# Patient Record
Sex: FEMALE | Race: WHITE | NOT HISPANIC OR LATINO | Employment: FULL TIME | URBAN - METROPOLITAN AREA
[De-identification: names, ages, dates, MRNs, and addresses within clinical notes are randomized per-mention and may not be internally consistent; named-entity substitution may affect disease eponyms.]

---

## 2019-12-24 ENCOUNTER — APPOINTMENT (OUTPATIENT)
Dept: URGENT CARE | Facility: CLINIC | Age: 49
End: 2019-12-24

## 2019-12-24 DIAGNOSIS — Z02.1 PRE-EMPLOYMENT HEALTH SCREENING EXAMINATION: Primary | ICD-10-CM

## 2019-12-24 LAB
HBV SURFACE AB SER-ACNC: 5.95 MIU/ML
RUBV IGG SERPL IA-ACNC: >175 IU/ML

## 2019-12-24 PROCEDURE — 86706 HEP B SURFACE ANTIBODY: CPT | Performed by: NURSE PRACTITIONER

## 2019-12-24 PROCEDURE — 86735 MUMPS ANTIBODY: CPT | Performed by: NURSE PRACTITIONER

## 2019-12-24 PROCEDURE — 86765 RUBEOLA ANTIBODY: CPT | Performed by: NURSE PRACTITIONER

## 2019-12-24 PROCEDURE — 86480 TB TEST CELL IMMUN MEASURE: CPT | Performed by: NURSE PRACTITIONER

## 2019-12-24 PROCEDURE — 86762 RUBELLA ANTIBODY: CPT | Performed by: NURSE PRACTITIONER

## 2019-12-24 PROCEDURE — 86787 VARICELLA-ZOSTER ANTIBODY: CPT | Performed by: NURSE PRACTITIONER

## 2019-12-27 LAB
GAMMA INTERFERON BACKGROUND BLD IA-ACNC: 0.06 IU/ML
M TB IFN-G BLD-IMP: NEGATIVE
M TB IFN-G CD4+ BCKGRND COR BLD-ACNC: 0 IU/ML
M TB IFN-G CD4+ BCKGRND COR BLD-ACNC: 0 IU/ML
MEV IGG SER QL: NORMAL
MITOGEN IGNF BCKGRD COR BLD-ACNC: >10 IU/ML
MUV IGG SER QL: NORMAL
VZV IGG SER IA-ACNC: NORMAL

## 2020-07-24 ENCOUNTER — NURSE TRIAGE (OUTPATIENT)
Dept: OTHER | Facility: OTHER | Age: 50
End: 2020-07-24

## 2020-07-24 DIAGNOSIS — Z11.59 SPECIAL SCREENING EXAMINATION FOR VIRAL DISEASE: Primary | ICD-10-CM

## 2020-07-24 DIAGNOSIS — Z11.59 SPECIAL SCREENING EXAMINATION FOR VIRAL DISEASE: ICD-10-CM

## 2020-07-24 PROCEDURE — U0003 INFECTIOUS AGENT DETECTION BY NUCLEIC ACID (DNA OR RNA); SEVERE ACUTE RESPIRATORY SYNDROME CORONAVIRUS 2 (SARS-COV-2) (CORONAVIRUS DISEASE [COVID-19]), AMPLIFIED PROBE TECHNIQUE, MAKING USE OF HIGH THROUGHPUT TECHNOLOGIES AS DESCRIBED BY CMS-2020-01-R: HCPCS

## 2020-07-24 NOTE — TELEPHONE ENCOUNTER
Regarding: covid  ----- Message from [facility] sent at 7/24/2020  7:45 AM EDT -----  Patient would like to be tested for covid due to possible contact with positive patient

## 2020-07-24 NOTE — TELEPHONE ENCOUNTER
Reason for Disposition   [1] Caller concerned that exposure to COVID-19 occurred BUT [2] does not meet COVID-19 EXPOSURE criteria from [facility]    Answer Assessment - Initial Assessment Questions  1  CLOSE CONTACT: "Who is the person with the confirmed or suspected COVID-19 infection that you were exposed to?"      Denies known positive contact but  has a fever and is vomiting  States she works for [employer]  2  PLACE of CONTACT: "Where were you when you were exposed to COVID-19?" (e g , home, school, medical waiting room; which city?)      Denies known contact  3  TYPE of CONTACT: "How much contact was there?" (e g , sitting next to, live in same house, work in same office, same building)      Denies known contact  4  DURATION of CONTACT: "How long were you in contact with the COVID-19 patient?" (e g , a few seconds, passed by person, a few minutes, live with the patient)      Denies  5  DATE of CONTACT: "When did you have contact with a COVID-19 patient?" (e g , how many days ago)      Denies  6  TRAVEL: "Have you traveled out of the country recently?" If so, "When and where?"      * Also ask about out-of-state travel, since the CDC has identified some high-risk cities for community spread in the [address] [place] [address] [address]  * Note: Travel becomes less relevant if there is widespread community transmission where the patient lives  Denies  7  COMMUNITY SPREAD: "Are there lots of cases of COVID-19 (community spread) where you live?" (See public health department website, if unsure)        Uncertain  8  SYMPTOMS: "Do you have any symptoms?" (e g , fever, cough, breathing difficulty)      No symptoms at this time  9  PREGNANCY OR POSTPARTUM: "Is there any chance you are pregnant?" "When was your last menstrual period?" "Did you deliver in the last 2 weeks?"      Denies  10  HIGH RISK: "Do you have any heart or lung problems?  Do you have a weak immune system?" (e g , CHF, COPD, asthma, HIV positive, chemotherapy, renal failure, diabetes mellitus, sickle cell anemia)        Denies  Protocols used: CORONAVIRUS (KRJCN-02) EXPOSURE-ADULT-    Currently does not have a PCP  Pt is asking to get tested

## 2020-07-25 LAB — SARS-COV-2 RNA SPEC QL NAA+PROBE: NOT DETECTED

## 2020-11-09 ENCOUNTER — OFFICE VISIT (OUTPATIENT)
Dept: OBGYN CLINIC | Facility: CLINIC | Age: 50
End: 2020-11-09
Payer: COMMERCIAL

## 2020-11-09 VITALS — WEIGHT: 136 LBS | SYSTOLIC BLOOD PRESSURE: 122 MMHG | TEMPERATURE: 98.6 F | DIASTOLIC BLOOD PRESSURE: 70 MMHG

## 2020-11-09 DIAGNOSIS — Z12.31 ENCOUNTER FOR SCREENING MAMMOGRAM FOR MALIGNANT NEOPLASM OF BREAST: Primary | ICD-10-CM

## 2020-11-09 DIAGNOSIS — D25.2 SUBSEROSAL LEIOMYOMA OF UTERUS: ICD-10-CM

## 2020-11-09 DIAGNOSIS — Z01.411 ENCOUNTER FOR GYNECOLOGICAL EXAMINATION WITH ABNORMAL FINDING: ICD-10-CM

## 2020-11-09 PROCEDURE — 99386 PREV VISIT NEW AGE 40-64: CPT | Performed by: OBSTETRICS & GYNECOLOGY

## 2020-11-11 ENCOUNTER — ULTRASOUND (OUTPATIENT)
Dept: OBGYN CLINIC | Facility: CLINIC | Age: 50
End: 2020-11-11
Payer: COMMERCIAL

## 2020-11-11 DIAGNOSIS — D21.9 FIBROID: Primary | ICD-10-CM

## 2020-11-11 PROCEDURE — 76856 US EXAM PELVIC COMPLETE: CPT | Performed by: OBSTETRICS & GYNECOLOGY

## 2020-11-11 PROCEDURE — 76830 TRANSVAGINAL US NON-OB: CPT | Performed by: OBSTETRICS & GYNECOLOGY

## 2020-11-14 ENCOUNTER — LAB REQUISITION (OUTPATIENT)
Dept: LAB | Facility: HOSPITAL | Age: 50
End: 2020-11-14

## 2020-11-14 DIAGNOSIS — Z20.828 CONTACT WITH AND (SUSPECTED) EXPOSURE TO OTHER VIRAL COMMUNICABLE DISEASES: ICD-10-CM

## 2020-11-14 PROCEDURE — U0003 INFECTIOUS AGENT DETECTION BY NUCLEIC ACID (DNA OR RNA); SEVERE ACUTE RESPIRATORY SYNDROME CORONAVIRUS 2 (SARS-COV-2) (CORONAVIRUS DISEASE [COVID-19]), AMPLIFIED PROBE TECHNIQUE, MAKING USE OF HIGH THROUGHPUT TECHNOLOGIES AS DESCRIBED BY CMS-2020-01-R: HCPCS | Performed by: PHYSICIAN ASSISTANT

## 2020-11-15 LAB — SARS-COV-2 RNA SPEC QL NAA+PROBE: NOT DETECTED

## 2020-12-22 ENCOUNTER — IMMUNIZATIONS (OUTPATIENT)
Dept: FAMILY MEDICINE CLINIC | Facility: HOSPITAL | Age: 50
End: 2020-12-22
Payer: COMMERCIAL

## 2020-12-22 DIAGNOSIS — Z23 ENCOUNTER FOR IMMUNIZATION: ICD-10-CM

## 2020-12-22 PROCEDURE — 91300 SARS-COV-2 / COVID-19 MRNA VACCINE (PFIZER-BIONTECH) 30 MCG: CPT

## 2020-12-22 PROCEDURE — 0001A SARS-COV-2 / COVID-19 MRNA VACCINE (PFIZER-BIONTECH) 30 MCG: CPT

## 2021-01-12 ENCOUNTER — IMMUNIZATIONS (OUTPATIENT)
Dept: FAMILY MEDICINE CLINIC | Facility: HOSPITAL | Age: 51
End: 2021-01-12

## 2021-01-12 DIAGNOSIS — Z23 ENCOUNTER FOR IMMUNIZATION: ICD-10-CM

## 2021-01-12 PROCEDURE — 91300 SARS-COV-2 / COVID-19 MRNA VACCINE (PFIZER-BIONTECH) 30 MCG: CPT

## 2021-01-12 PROCEDURE — 0002A SARS-COV-2 / COVID-19 MRNA VACCINE (PFIZER-BIONTECH) 30 MCG: CPT

## 2021-01-22 ENCOUNTER — HOSPITAL ENCOUNTER (OUTPATIENT)
Dept: MAMMOGRAPHY | Facility: IMAGING CENTER | Age: 51
Discharge: HOME | End: 2021-01-22
Payer: COMMERCIAL

## 2021-01-22 VITALS — WEIGHT: 135 LBS | BODY MASS INDEX: 21.19 KG/M2 | HEIGHT: 67 IN

## 2021-01-22 DIAGNOSIS — Z12.31 ENCOUNTER FOR SCREENING MAMMOGRAM FOR MALIGNANT NEOPLASM OF BREAST: ICD-10-CM

## 2021-01-22 DIAGNOSIS — Z12.31 VISIT FOR SCREENING MAMMOGRAM: ICD-10-CM

## 2021-01-22 PROCEDURE — 77067 SCR MAMMO BI INCL CAD: CPT

## 2021-01-22 PROCEDURE — 77063 BREAST TOMOSYNTHESIS BI: CPT

## 2021-02-15 ENCOUNTER — OFFICE VISIT (OUTPATIENT)
Dept: OBGYN CLINIC | Facility: CLINIC | Age: 51
End: 2021-02-15
Payer: COMMERCIAL

## 2021-02-15 VITALS
BODY MASS INDEX: 21.5 KG/M2 | DIASTOLIC BLOOD PRESSURE: 70 MMHG | SYSTOLIC BLOOD PRESSURE: 112 MMHG | HEIGHT: 67 IN | WEIGHT: 137 LBS

## 2021-02-15 DIAGNOSIS — N92.0 MENORRHAGIA WITH REGULAR CYCLE: Primary | ICD-10-CM

## 2021-02-15 PROCEDURE — 99203 OFFICE O/P NEW LOW 30 MIN: CPT | Performed by: OBSTETRICS & GYNECOLOGY

## 2021-02-15 RX ORDER — TRANEXAMIC ACID 650 1/1
1300 TABLET ORAL 3 TIMES DAILY
Qty: 30 TABLET | Refills: 2 | Status: SHIPPED | OUTPATIENT
Start: 2021-02-15 | End: 2021-02-20

## 2021-02-16 ENCOUNTER — HOSPITAL ENCOUNTER (OUTPATIENT)
Dept: ULTRASOUND IMAGING | Facility: CLINIC | Age: 51
Discharge: HOME | End: 2021-02-16
Payer: COMMERCIAL

## 2021-02-16 ENCOUNTER — HOSPITAL ENCOUNTER (OUTPATIENT)
Dept: MAMMOGRAPHY | Facility: CLINIC | Age: 51
Discharge: HOME | End: 2021-02-16
Payer: COMMERCIAL

## 2021-02-16 VITALS — HEIGHT: 66 IN | WEIGHT: 137 LBS | BODY MASS INDEX: 22.02 KG/M2

## 2021-02-16 DIAGNOSIS — R92.8 ABNORMAL MAMMOGRAM: ICD-10-CM

## 2021-02-16 PROCEDURE — 77065 DX MAMMO INCL CAD UNI: CPT

## 2021-02-16 PROCEDURE — 76642 ULTRASOUND BREAST LIMITED: CPT

## 2021-02-16 PROCEDURE — G0279 TOMOSYNTHESIS, MAMMO: HCPCS

## 2021-02-16 NOTE — PROGRESS NOTES
Gynecology   Deanna Barclay 53 y o  female MRN: [id number]    Assessment/Plan     Menorrhagia with regular cycle due to fibroid uterus    - CBC and Platelet; Future  - Tranexamic Acid (Lysteda) 650 MG TABS; Take 2 tablets (1,300 mg total) by mouth 3 (three) times a day for 5 days  Dispense: 30 tablet; Refill: 2  - if CBC wnl and Lysteda controls flow then expectant management is appropriate  - if anemic OR Lysteda fails to control bleeding patient should consider surgery  - reviewed how Mirena IUD and Novasure are not indicated size of uterus and s/p [place]    History of Present Illness     HPI:  Deanna Barclay is a 53 y o  female who presents with known fibroid uterus s/p uterine embolization approximately 10 years ago  She states the first 2 days of her menses are debilitating; has to double up on products and usually misses activities  However, menses are better since the [place]  She notes a slight increase in amount of flow over the past year  Her GYN told her she needs a hysterectomy  No medical management has been attempted  Patient feels that her symptoms are not sufficient enough for surgery  Hoping menopause will come soon        Historical Information   Past Medical History:   Diagnosis Date    Fibroid     Herpes      Past Surgical History:   Procedure Laterality Date    BREAST CYST ASPIRATION Left     neg results    IR UTERINE ARTERY EMBOLIZATION       OB/GYN History:   OB History        1    Para   1    Term   1            AB        Living   1       SAB        TAB        Ectopic        Multiple        Live Births   1                 Family History   Problem Relation Age of Onset   Jaiden Carias Hypertension Mother     Breast cancer Mother     Parkinsonism Father     No Known Problems Sister     No Known Problems Maternal Grandmother     No Known Problems Maternal Grandfather     No Known Problems Paternal Grandmother     No Known Problems Paternal Grandfather     No Known Problems Sister     No Known Problems Half-Sister     No Known Problems Maternal Aunt     No Known Problems Maternal Aunt     No Known Problems Maternal Aunt     No Known Problems Maternal Aunt     No Known Problems Maternal Aunt     No Known Problems Maternal Aunt     No Known Problems Maternal Aunt     No Known Problems Paternal Aunt     Heart disease Son      Social History   Social History     Substance and Sexual Activity   Alcohol Use Yes    Comment: social     Social History     Substance and Sexual Activity   Drug Use Never     Social History     Tobacco Use   Smoking Status Never Smoker   Smokeless Tobacco Never Used     E-Cigarette/Vaping    E-Cigarette Use Never User      E-Cigarette/Vaping Substances    Nicotine No     THC No     CBD No     Flavoring No     Other No     Unknown No        Meds/Allergies   No current outpatient medications on file prior to visit  No current facility-administered medications on file prior to visit  No Known Allergies     Review of Systems   Constitution: Negative for chills, decreased appetite, diaphoresis and fever  Respiratory: Negative for cough, shortness of breath, sputum production and wheezing  Gastrointestinal: Negative for bloating, abdominal pain, change in bowel habit and constipation  Genitourinary: Positive for menorrhagia  Negative for incomplete emptying, non-menstrual bleeding and pelvic pain  Objective   Vitals: Blood pressure 112/70, height 5' 6 5" (1 689 m), weight 62 1 kg (137 lb), last menstrual period 01/24/2021  Physical Exam  Constitutional:       Appearance: Normal appearance  HENT:      Head: Normocephalic  Cardiovascular:      Rate and Rhythm: Normal rate and regular rhythm  Pulmonary:      Effort: Pulmonary effort is normal    Musculoskeletal:         General: No swelling  Neurological:      General: No focal deficit present        Mental Status: She is alert and oriented to person, place, and time    Skin:     General: Skin is warm and dry  Psychiatric:         Mood and Affect: Mood normal          Behavior: Behavior normal    Vitals signs reviewed

## 2021-02-17 ENCOUNTER — TRANSCRIBE ORDERS (OUTPATIENT)
Dept: LAB | Facility: CLINIC | Age: 51
End: 2021-02-17

## 2021-02-17 ENCOUNTER — LAB (OUTPATIENT)
Dept: LAB | Facility: CLINIC | Age: 51
End: 2021-02-17
Payer: COMMERCIAL

## 2021-02-17 DIAGNOSIS — N92.0 MENORRHAGIA WITH REGULAR CYCLE: ICD-10-CM

## 2021-02-17 LAB
ERYTHROCYTE [DISTWIDTH] IN BLOOD BY AUTOMATED COUNT: 13.7 % (ref 11.6–15.1)
HCT VFR BLD AUTO: 40.4 % (ref 34.8–46.1)
HGB BLD-MCNC: 13.1 G/DL (ref 11.5–15.4)
MCH RBC QN AUTO: 29.2 PG (ref 26.8–34.3)
MCHC RBC AUTO-ENTMCNC: 32.4 G/DL (ref 31.4–37.4)
MCV RBC AUTO: 90 FL (ref 82–98)
PLATELET # BLD AUTO: 260 THOUSANDS/UL (ref 149–390)
PMV BLD AUTO: 10.7 FL (ref 8.9–12.7)
RBC # BLD AUTO: 4.49 MILLION/UL (ref 3.81–5.12)
WBC # BLD AUTO: 6.87 THOUSAND/UL (ref 4.31–10.16)

## 2021-02-17 PROCEDURE — 85027 COMPLETE CBC AUTOMATED: CPT

## 2021-02-17 PROCEDURE — 36415 COLL VENOUS BLD VENIPUNCTURE: CPT

## 2021-06-07 ENCOUNTER — TELEPHONE (OUTPATIENT)
Dept: OBGYN CLINIC | Facility: CLINIC | Age: 51
End: 2021-06-07

## 2021-06-07 DIAGNOSIS — D21.9 FIBROIDS: Primary | ICD-10-CM

## 2021-06-07 NOTE — TELEPHONE ENCOUNTER
Pt is saying she was referred to get her thyroid checked with us wondering about that with dr obando

## 2021-06-07 NOTE — TELEPHONE ENCOUNTER
Pt is asking about getting her Fibroids checked via US, she said she feels like she has to urinate more frequently and thinks they are getting larger

## 2021-06-09 ENCOUNTER — HOSPITAL ENCOUNTER (OUTPATIENT)
Dept: ULTRASOUND IMAGING | Facility: HOSPITAL | Age: 51
Discharge: HOME | End: 2021-06-09
Payer: COMMERCIAL

## 2021-06-09 DIAGNOSIS — D21.9 FIBROIDS: ICD-10-CM

## 2021-06-09 PROCEDURE — 76830 TRANSVAGINAL US NON-OB: CPT

## 2021-06-09 PROCEDURE — 76856 US EXAM PELVIC COMPLETE: CPT

## 2021-06-17 ENCOUNTER — TELEPHONE (OUTPATIENT)
Dept: OBGYN CLINIC | Facility: CLINIC | Age: 51
End: 2021-06-17

## 2021-06-17 NOTE — TELEPHONE ENCOUNTER
----- Message from Alcides Wayne MD sent at 6/17/2021 10:55 AM EDT -----  Please notify patient that her uterus is overall smaller than her scan in 2014  No concerning findings  If her bleeding becomes unmanageable she needs to call me!

## 2022-02-22 ENCOUNTER — OFFICE VISIT (OUTPATIENT)
Dept: INTERNAL MEDICINE CLINIC | Facility: CLINIC | Age: 52
End: 2022-02-22
Payer: COMMERCIAL

## 2022-02-22 VITALS
WEIGHT: 134 LBS | HEIGHT: 67 IN | OXYGEN SATURATION: 98 % | DIASTOLIC BLOOD PRESSURE: 70 MMHG | SYSTOLIC BLOOD PRESSURE: 120 MMHG | BODY MASS INDEX: 21.03 KG/M2 | RESPIRATION RATE: 12 BRPM | TEMPERATURE: 97.8 F | HEART RATE: 66 BPM

## 2022-02-22 DIAGNOSIS — Z12.11 SCREENING FOR COLORECTAL CANCER: ICD-10-CM

## 2022-02-22 DIAGNOSIS — Z12.12 SCREENING FOR COLORECTAL CANCER: ICD-10-CM

## 2022-02-22 DIAGNOSIS — Z12.4 SCREENING FOR CERVICAL CANCER: ICD-10-CM

## 2022-02-22 DIAGNOSIS — Z12.31 ENCOUNTER FOR SCREENING MAMMOGRAM FOR BREAST CANCER: ICD-10-CM

## 2022-02-22 DIAGNOSIS — Z00.00 ANNUAL PHYSICAL EXAM: Primary | ICD-10-CM

## 2022-02-22 DIAGNOSIS — Z13.6 SCREENING FOR CARDIOVASCULAR CONDITION: ICD-10-CM

## 2022-02-22 DIAGNOSIS — D36.9 DERMOID: ICD-10-CM

## 2022-02-22 DIAGNOSIS — N95.1 PERIMENOPAUSAL: ICD-10-CM

## 2022-02-22 DIAGNOSIS — Z78.9 DIETING: ICD-10-CM

## 2022-02-22 PROBLEM — N92.4 EXCESSIVE BLEEDING IN PREMENOPAUSAL PERIOD: Status: ACTIVE | Noted: 2022-02-22

## 2022-02-22 PROBLEM — M75.102 ROTATOR CUFF TEAR, LEFT: Status: ACTIVE | Noted: 2022-02-22

## 2022-02-22 PROBLEM — M51.26 HNP (HERNIATED NUCLEUS PULPOSUS), LUMBAR: Status: ACTIVE | Noted: 2022-02-22

## 2022-02-22 PROBLEM — M21.851 HIP DYSPLASIA, ACQUIRED, RIGHT: Status: ACTIVE | Noted: 2022-02-22

## 2022-02-22 PROBLEM — D21.9 FIBROIDS: Status: ACTIVE | Noted: 2022-02-22

## 2022-02-22 PROCEDURE — 99386 PREV VISIT NEW AGE 40-64: CPT | Performed by: INTERNAL MEDICINE

## 2022-02-22 RX ORDER — TRANEXAMIC ACID 650 1/1
TABLET ORAL
COMMUNITY
Start: 2021-11-23

## 2022-02-22 NOTE — PATIENT INSTRUCTIONS

## 2022-02-22 NOTE — PROGRESS NOTES
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  Sees gyn  Mammogram  Keto diet  Since 2021        [address] [facility] INTERNAL MEDICINE    NAME: Oneal Green  AGE: 49 y o  SEX: female  : 1970     DATE: 2022     Assessment and Plan:     Problem List Items Addressed This Visit        Other    Screening for cardiovascular condition - Primary    Relevant Orders    Lipid panel    Screening for cervical cancer    Relevant Orders    Ambulatory referral to Obstetrics / Gynecology    Perimenopausal    Relevant Orders    FSH and LH    Dieting    Relevant Orders    Vitamin B12      Other Visit Diagnoses     Dermoid        Relevant Orders    Ambulatory Referral to Dermatology          Immunizations and preventive care screenings were discussed with patient today  Appropriate education was printed on patient's after visit summary  Counseling:  · Exercise: the importance of regular exercise/physical activity was discussed  Recommend exercise 3-5 times per week for at least 30 minutes  Depression Screening and Follow-up Plan: Patient was screened for depression during today's encounter  They screened negative with a PHQ-2 score of 0  Return in 6 months (on 2022), or if symptoms worsen or fail to improve  Chief Complaint:     Chief Complaint   Patient presents with    Annual Exam      History of Present Illness:     Adult Annual Physical   Patient here for a comprehensive physical exam  The patient reports no problems  Diet and Physical Activity  · Diet/Nutrition: well balanced diet  · Exercise: walking  Depression Screening  PHQ-2/9 Depression Screening    Little interest or pleasure in doing things: 0 - not at all  Feeling down, depressed, or hopeless: 0 - not at all  PHQ-2 Score: 0  PHQ-2 Interpretation: Negative depression screen       General Health  · Sleep: sleeps well     · Hearing: normal - bilateral   · Vision: no vision problems  · Dental: regular dental visits  /GYN Health  · Patient is: premenopausal  · Last menstrual period:    · Contraceptive method:   Yunier Rahman Review of Systems:     Review of Systems   Constitutional: Negative for activity change, appetite change, chills, diaphoresis, fatigue and fever  HENT: Negative for congestion, facial swelling, hearing loss, mouth sores, rhinorrhea, sore throat, trouble swallowing and voice change  Eyes: Negative for photophobia and pain  Respiratory: Negative for apnea, cough, chest tightness, shortness of breath and stridor  Cardiovascular: Negative for chest pain, palpitations and leg swelling  Gastrointestinal: Negative for abdominal distention, abdominal pain, blood in stool and constipation  Endocrine: Negative for cold intolerance and heat intolerance  Genitourinary: Negative for difficulty urinating, dysuria, flank pain, genital sores, hematuria and urgency  Musculoskeletal: Negative for arthralgias, back pain, gait problem, joint swelling and myalgias  Skin: Negative for rash and wound  Allergic/Immunologic: Negative for environmental allergies, food allergies and immunocompromised state  Neurological: Negative for dizziness, tremors, seizures, syncope, facial asymmetry, speech difficulty, weakness, light-headedness, numbness and headaches  Hematological: Negative for adenopathy  Does not bruise/bleed easily  Psychiatric/Behavioral: Negative for agitation, behavioral problems, hallucinations, self-injury, sleep disturbance and suicidal ideas        Past Medical History:     Past Medical History:   Diagnosis Date    Fibroid     Herpes       Past Surgical History:     Past Surgical History:   Procedure Laterality Date    BREAST CYST ASPIRATION Left 2009    neg results    IR UTERINE ARTERY EMBOLIZATION        Social History:     Social History     Socioeconomic History    Marital status: /Civil Union Spouse name: None    Number of children: None    Years of education: None    Highest education level: None   Occupational History    Occupation: Pediatric echo [employer]   Tobacco Use    Smoking status: Never Smoker    Smokeless tobacco: Never Used   Vaping Use    Vaping Use: Never used   Substance and Sexual Activity    Alcohol use: Yes     Comment: social    Drug use: Never    Sexual activity: Yes     Partners: Male     Birth control/protection: Male Sterilization   Other Topics Concern    None   Social History Narrative    None     Social Determinants of Health     Financial Resource Strain: Not on file   Food Insecurity: Not on file   Transportation Needs: Not on file   Physical Activity: Not on file   Stress: Not on file   Social Connections: Not on file   Intimate Partner Violence: Not on file   Housing Stability: Not on file      Family History:     Family History   Problem Relation Age of Onset    Hypertension Mother     Breast cancer Mother     Parkinsonism Father     No Known Problems Sister     No Known Problems Maternal Grandmother     No Known Problems Maternal Grandfather     No Known Problems Paternal Grandmother     No Known Problems Paternal Grandfather     No Known Problems Sister     No Known Problems Half-Sister     No Known Problems Maternal Aunt     No Known Problems Maternal Aunt     No Known Problems Maternal Aunt     No Known Problems Maternal Aunt     No Known Problems Maternal Aunt     No Known Problems Maternal Aunt     No Known Problems Maternal Aunt     No Known Problems Paternal Aunt     Heart disease Son       Current Medications:     Current Outpatient Medications   Medication Sig Dispense Refill    Tranexamic Acid 650 MG TABS take 2 tablets by mouth three times a day for 5 days       No current facility-administered medications for this visit        Allergies:     No Known Allergies   Physical Exam:     /70   Pulse 66   Temp 97 8 °F (36 6 °C) Resp 12    5' 7" (1 702 m)   Wt 60 8 kg (134 lb)   SpO2 98%   BMI 20 99 kg/m²     Physical Exam  Constitutional:       General: She is not in acute distress  Appearance: Normal appearance  She is not ill-appearing or toxic-appearing  HENT:      Head: Normocephalic and atraumatic  Right Ear: Tympanic membrane and external ear normal       Left Ear: Tympanic membrane and external ear normal       Nose: Nose normal       Mouth/Throat:      Mouth: Mucous membranes are moist       Pharynx: Oropharynx is clear  Eyes:      General: No scleral icterus  Right eye: No discharge  Left eye: No discharge  Extraocular Movements: Extraocular movements intact  Conjunctiva/sclera: Conjunctivae normal       Pupils: Pupils are equal, round, and reactive to light  Neck:      Vascular: No carotid bruit  Cardiovascular:      Rate and Rhythm: Normal rate and regular rhythm  Pulses: Normal pulses  Heart sounds: No murmur heard  No friction rub  No gallop  Pulmonary:      Effort: Pulmonary effort is normal       Breath sounds: Normal breath sounds  No wheezing, rhonchi or rales  Abdominal:      General: Bowel sounds are normal  There is no distension  Palpations: Abdomen is soft  There is no mass  Tenderness: There is no guarding or rebound  Musculoskeletal:         General: No swelling  Cervical back: Normal range of motion and neck supple  No rigidity  Right lower leg: No edema  Left lower leg: No edema  Lymphadenopathy:      Cervical: No cervical adenopathy  Skin:     General: Skin is warm  Capillary Refill: Capillary refill takes less than 2 seconds  Coloration: Skin is not jaundiced  Findings: No rash  Neurological:      General: No focal deficit present  Mental Status: She is alert and oriented to person, place, and time  Cranial Nerves: No cranial nerve deficit  Sensory: No sensory deficit        Motor: No weakness        Gait: Gait normal       Deep Tendon Reflexes: Reflexes normal    Psychiatric:         Mood and Affect: Mood normal          Behavior: Behavior normal          Judgment: Judgment normal           Satya Staff, DO  [facility] INTERNAL MEDICINE

## 2022-02-24 ENCOUNTER — APPOINTMENT (OUTPATIENT)
Dept: LAB | Facility: CLINIC | Age: 52
End: 2022-02-24
Payer: COMMERCIAL

## 2022-02-24 DIAGNOSIS — Z00.00 ANNUAL PHYSICAL EXAM: ICD-10-CM

## 2022-02-24 DIAGNOSIS — Z78.9 DIETING: ICD-10-CM

## 2022-02-24 DIAGNOSIS — N95.1 PERIMENOPAUSAL: ICD-10-CM

## 2022-02-24 DIAGNOSIS — Z13.6 SCREENING FOR CARDIOVASCULAR CONDITION: ICD-10-CM

## 2022-02-24 LAB
ALBUMIN SERPL BCP-MCNC: 3.9 G/DL (ref 3.5–5)
ALP SERPL-CCNC: 55 U/L (ref 46–116)
ALT SERPL W P-5'-P-CCNC: 21 U/L (ref 12–78)
ANION GAP SERPL CALCULATED.3IONS-SCNC: 6 MMOL/L (ref 4–13)
AST SERPL W P-5'-P-CCNC: 12 U/L (ref 5–45)
BILIRUB SERPL-MCNC: 0.29 MG/DL (ref 0.2–1)
BUN SERPL-MCNC: 23 MG/DL (ref 5–25)
CALCIUM SERPL-MCNC: 9.4 MG/DL (ref 8.3–10.1)
CHLORIDE SERPL-SCNC: 104 MMOL/L (ref 100–108)
CHOLEST SERPL-MCNC: 187 MG/DL
CO2 SERPL-SCNC: 26 MMOL/L (ref 21–32)
CREAT SERPL-MCNC: 0.59 MG/DL (ref 0.6–1.3)
ERYTHROCYTE [DISTWIDTH] IN BLOOD BY AUTOMATED COUNT: 16.1 % (ref 11.6–15.1)
FSH SERPL-ACNC: 11 MIU/ML
GFR SERPL CREATININE-BSD FRML MDRD: 107 ML/MIN/1.73SQ M
GLUCOSE P FAST SERPL-MCNC: 85 MG/DL (ref 65–99)
HCT VFR BLD AUTO: 36.7 % (ref 34.8–46.1)
HDLC SERPL-MCNC: 61 MG/DL
HGB BLD-MCNC: 11.5 G/DL (ref 11.5–15.4)
LDLC SERPL CALC-MCNC: 113 MG/DL (ref 0–100)
LH SERPL-ACNC: 5.1 MIU/ML
MCH RBC QN AUTO: 28.9 PG (ref 26.8–34.3)
MCHC RBC AUTO-ENTMCNC: 31.3 G/DL (ref 31.4–37.4)
MCV RBC AUTO: 92 FL (ref 82–98)
NONHDLC SERPL-MCNC: 126 MG/DL
PLATELET # BLD AUTO: 254 THOUSANDS/UL (ref 149–390)
PMV BLD AUTO: 11.5 FL (ref 8.9–12.7)
POTASSIUM SERPL-SCNC: 3.8 MMOL/L (ref 3.5–5.3)
PROT SERPL-MCNC: 7.5 G/DL (ref 6.4–8.2)
RBC # BLD AUTO: 3.98 MILLION/UL (ref 3.81–5.12)
SODIUM SERPL-SCNC: 136 MMOL/L (ref 136–145)
TRIGL SERPL-MCNC: 64 MG/DL
TSH SERPL DL<=0.05 MIU/L-ACNC: 1.51 UIU/ML (ref 0.36–3.74)
VIT B12 SERPL-MCNC: 393 PG/ML (ref 100–900)
WBC # BLD AUTO: 6.17 THOUSAND/UL (ref 4.31–10.16)

## 2022-02-24 PROCEDURE — 84443 ASSAY THYROID STIM HORMONE: CPT

## 2022-02-24 PROCEDURE — 85027 COMPLETE CBC AUTOMATED: CPT

## 2022-02-24 PROCEDURE — 83001 ASSAY OF GONADOTROPIN (FSH): CPT

## 2022-02-24 PROCEDURE — 80061 LIPID PANEL: CPT

## 2022-02-24 PROCEDURE — 36415 COLL VENOUS BLD VENIPUNCTURE: CPT

## 2022-02-24 PROCEDURE — 82607 VITAMIN B-12: CPT

## 2022-02-24 PROCEDURE — 83002 ASSAY OF GONADOTROPIN (LH): CPT

## 2022-02-24 PROCEDURE — 80053 COMPREHEN METABOLIC PANEL: CPT

## 2022-03-17 LAB — COLOGUARD RESULT REPORTABLE: NEGATIVE

## 2022-03-29 ENCOUNTER — ANNUAL EXAM (OUTPATIENT)
Dept: OBGYN CLINIC | Facility: CLINIC | Age: 52
End: 2022-03-29
Payer: COMMERCIAL

## 2022-03-29 ENCOUNTER — APPOINTMENT (OUTPATIENT)
Dept: LAB | Facility: CLINIC | Age: 52
End: 2022-03-29
Payer: COMMERCIAL

## 2022-03-29 VITALS
BODY MASS INDEX: 20.34 KG/M2 | WEIGHT: 129.6 LBS | DIASTOLIC BLOOD PRESSURE: 72 MMHG | SYSTOLIC BLOOD PRESSURE: 122 MMHG | HEIGHT: 67 IN

## 2022-03-29 DIAGNOSIS — Z11.51 SCREENING FOR HUMAN PAPILLOMAVIRUS (HPV): ICD-10-CM

## 2022-03-29 DIAGNOSIS — Z01.419 ENCOUNTER FOR ANNUAL ROUTINE GYNECOLOGICAL EXAMINATION: Primary | ICD-10-CM

## 2022-03-29 DIAGNOSIS — D21.9 FIBROIDS: ICD-10-CM

## 2022-03-29 DIAGNOSIS — Z12.31 ENCOUNTER FOR SCREENING MAMMOGRAM FOR MALIGNANT NEOPLASM OF BREAST: ICD-10-CM

## 2022-03-29 DIAGNOSIS — Z00.8 HEALTH EXAMINATION IN POPULATION SURVEY: ICD-10-CM

## 2022-03-29 PROBLEM — N95.1 PERIMENOPAUSAL: Status: RESOLVED | Noted: 2022-02-22 | Resolved: 2022-03-29

## 2022-03-29 PROBLEM — D25.9 FIBROID UTERUS: Status: ACTIVE | Noted: 2022-03-29

## 2022-03-29 LAB
EST. AVERAGE GLUCOSE BLD GHB EST-MCNC: 91 MG/DL
HBA1C MFR BLD: 4.8 %

## 2022-03-29 PROCEDURE — S0612 ANNUAL GYNECOLOGICAL EXAMINA: HCPCS | Performed by: OBSTETRICS & GYNECOLOGY

## 2022-03-29 PROCEDURE — G0145 SCR C/V CYTO,THINLAYER,RESCR: HCPCS | Performed by: OBSTETRICS & GYNECOLOGY

## 2022-03-29 PROCEDURE — G0476 HPV COMBO ASSAY CA SCREEN: HCPCS | Performed by: OBSTETRICS & GYNECOLOGY

## 2022-03-29 PROCEDURE — 36415 COLL VENOUS BLD VENIPUNCTURE: CPT

## 2022-03-29 PROCEDURE — 83036 HEMOGLOBIN GLYCOSYLATED A1C: CPT

## 2022-03-29 NOTE — PROGRESS NOTES
Assessment/Plan:    1  Encounter for annual routine gynecological examination    - Liquid-based pap, screening    2  Screening for human papillomavirus (HPV)    - Liquid-based pap, screening    3  Encounter for screening mammogram for malignant neoplasm of breast    - Mammo screening bilateral w 3d & cad; Future    4  Fibroids    - continue to monitor; Ethel Santos      Gregoria Duarte is a 49 y o  female who presents for annual exam  Periods are regular every 28 days, lasting 6 days  Dysmenorrhea:none  Cyclic symptoms: none  No intermenstrual bleeding, spotting, or discharge  The patient has urinary urgency at times  No issues with IC  Current contraception: vasectomy  History of abnormal Pap smear: no  Regular self breast exam: yes  History of abnormal mammogram: yes - f/u negative 2021  History of abnormal lipids: no  Menstrual History:  OB History        1    Para   1    Term   1            AB        Living   1       SAB        IAB        Ectopic        Multiple        Live Births   1                  Patient's last menstrual period was 2022 (exact date)    Period Cycle (Days): 28  Period Duration (Days): 6  Period Pattern: Regular  Menstrual Flow: Heavy (Heavy bleeding x 2 days)  Dysmenorrhea: None    Past Medical History:   Diagnosis Date    Fibroid     Herpes        Family History   Problem Relation Age of Onset    Hypertension Mother     Breast cancer Mother     Parkinsonism Father     No Known Problems Sister     No Known Problems Maternal Grandmother     No Known Problems Maternal Grandfather     No Known Problems Paternal Grandmother     No Known Problems Paternal Grandfather     No Known Problems Sister     No Known Problems Half-Sister     No Known Problems Maternal Aunt     No Known Problems Maternal Aunt     No Known Problems Maternal Aunt     No Known Problems Maternal Aunt     No Known Problems Maternal Aunt     No Known Problems Maternal Aunt     No Known Problems Maternal Aunt     No Known Problems Paternal Aunt     Heart disease Son        The following portions of the patient's history were reviewed and updated as appropriate: allergies, current medications, past family history, past medical history, past social history, past surgical history and problem list     Review of Systems  Pertinent items are noted in HPI  Objective      /72 (BP Location: Right arm, Patient Position: Sitting, Cuff Size: Standard)   Ht 5' 6 5" (1 689 m)   Wt 58 8 kg (129 lb 9 6 oz)   LMP 03/13/2022 (Exact Date)   BMI 20 60 kg/m²     General:   alert and oriented, in no acute distress   Heart:    Breasts: regular rate and rhythm, S1, S2 normal, no murmur, click, rub or gallop   appear normal, no suspicious masses, no skin or nipple changes or axillary nodes     Lungs: clear to auscultation bilaterally   Abdomen: soft, non-tender, without masses or organomegaly   Vulva: normal   Vagina: normal mucosa   Cervix: no lesions   Uterus: bulky, mobile, size consistent with 12 weeks   Adnexa: normal adnexa and no mass, fullness, tenderness

## 2022-03-31 ENCOUNTER — TELEPHONE (OUTPATIENT)
Dept: DERMATOLOGY | Facility: CLINIC | Age: 52
End: 2022-03-31

## 2022-03-31 LAB
HPV HR 12 DNA CVX QL NAA+PROBE: NEGATIVE
HPV16 DNA CVX QL NAA+PROBE: NEGATIVE
HPV18 DNA CVX QL NAA+PROBE: NEGATIVE

## 2022-04-06 LAB
LAB AP GYN PRIMARY INTERPRETATION: NORMAL
LAB AP LMP: NORMAL
Lab: NORMAL

## 2022-04-26 ENCOUNTER — HOSPITAL ENCOUNTER (OUTPATIENT)
Dept: MAMMOGRAPHY | Facility: IMAGING CENTER | Age: 52
Discharge: HOME | End: 2022-04-26
Payer: COMMERCIAL

## 2022-04-26 VITALS — HEIGHT: 68 IN | WEIGHT: 127 LBS | BODY MASS INDEX: 19.25 KG/M2

## 2022-04-26 DIAGNOSIS — Z12.31 ENCOUNTER FOR SCREENING MAMMOGRAM FOR MALIGNANT NEOPLASM OF BREAST: ICD-10-CM

## 2022-04-26 PROCEDURE — 77067 SCR MAMMO BI INCL CAD: CPT

## 2022-04-26 PROCEDURE — 77063 BREAST TOMOSYNTHESIS BI: CPT

## 2022-05-02 ENCOUNTER — CONSULT (OUTPATIENT)
Dept: DERMATOLOGY | Facility: CLINIC | Age: 52
End: 2022-05-02
Payer: COMMERCIAL

## 2022-05-02 VITALS — TEMPERATURE: 98.9 F | WEIGHT: 126.98 LBS | BODY MASS INDEX: 19.25 KG/M2 | HEIGHT: 68 IN

## 2022-05-02 DIAGNOSIS — D18.01 CHERRY ANGIOMA: ICD-10-CM

## 2022-05-02 DIAGNOSIS — D36.9 DERMOID: ICD-10-CM

## 2022-05-02 DIAGNOSIS — L91.8 ACROCHORDON: Primary | ICD-10-CM

## 2022-05-02 PROCEDURE — 99243 OFF/OP CNSLTJ NEW/EST LOW 30: CPT | Performed by: DERMATOLOGY

## 2022-05-02 NOTE — PATIENT INSTRUCTIONS
MOSELEY ANGIOMAS    Assessment and Plan:  Based on a thorough discussion of this condition and the management approach to it (including a comprehensive discussion of the known risks, side effects and potential benefits of treatment), the patient (family) agrees to implement the following specific plan:   Discussed wanting the spot removed  Patient is aware that the spot is considered cosmetic; medically not necessary  Patient was informed that it is $250 per lesion  Patient wants (1) lesion on the back taken off  Assessment and Plan:    Cherry angioma, also known as [employer] spots, are benign vascular skin lesions  A "cherry angioma" is a firm red, blue or purple papule, 0 1-1 cm in diameter  When thrombosed, they can appear black in colour until evaluated with a dermatoscope when the red or purple colour is more easily seen  Cherry angioma may develop on any part of the body but most often appear on the scalp, face, lips and trunk  An angioma is due to proliferating endothelial cells; these are the cells that line the inside of a blood vessel  Angiomas can arise in early life or later in life; the most common type of angioma is a cherry angioma  Cherry angiomas are very common in males and females of any age or race  They are more noticeable in white skin than in skin of colour  They markedly increase in number from about the age of 36  There may be a family history of similar lesions  Eruptive cherry angiomas have been rarely reported to be associated with internal malignancy  The cause of angiomas is unknown  Genetic analysis of cherry angiomas has shown that they frequently carry specific somatic missense mutations in the GNAQ and GNA11 (Q209H) genes, which are involved in other vascular and melanocytic proliferations  Cherry angioma is usually diagnosed clinically and no investigations are necessary for the majority of lesions   It has a characteristic red-clod or lobular pattern on dermatoscopy (called lacunar pattern using conventional pattern analysis)  When there is uncertainty about the diagnosis, a biopsy may be performed  The angioma is composed of venules in a thickened papillary dermis  Collagen bundles may be prominent between the lobules  Cherry angiomas are harmless, so they do not usually have to be treated  Occasionally, they are removed to exclude a malignant skin lesion such as a nodular melanoma or because they are irritated or bleeding (and a subsequent risk for infection)  To decrease friction over the lesions, we recommend Neutrogena Daily Defense SPF 50+ at least 3 times a day  ACROCHORDON ("SKIN TAG")    Assessment and Plan:  Based on a thorough discussion of this condition and the management approach to it (including a comprehensive discussion of the known risks, side effects and potential benefits of treatment), the patient (family) agrees to implement the following specific plan:   Benign; Reassured    Skin tags are common, soft, harmless skin lesions that are also called, in the appropriate settings, papillomas, fibroepithelial polyps, and soft fibromas  They are made up of loosely arranged collagen fibers and blood vessels surrounded by a thickened or thinned-out epidermis  Skin tags tend to develop in both men and women as we grow older  They are usually found on the skin folds (neck, armpits, groin)  It is not known what specifically causes skin tags    Certain factors, though, do appear to play a role:   Chaffing and irritation from skin rubbing together   High levels of growth factors (as seen, for example, in pregnancy or in acromegaly/gigantism)   Insulin resistance   Human papillomavirus (wart virus)    We discussed that most skin tags do not need to be treated unless they are specifically causing the patient physical distress or limitation or pose a risk for a larger problem such as an infection that forms secondary to excoriation or chronic irritation      We had a thorough discussion of treatment options and specific risks (including that any procedural treatment may not be covered by insurance and would then be the patient's responsibility) and benefits/alternatives including but not limited to the following:   Cryotherapy (freezing)   Shave removal   Surgical excision (snip excision with scissors)   Electrosurgery   Ligation (we do not do this procedure and counseled against it due to risk of tissue necrosis and infection)

## 2022-05-02 NOTE — PROGRESS NOTES
[facility] Note     Patient Name: Leeann Mitchell  Encounter Date: 5/2/2022     Have you been cared for by a [facility] in the last 3 years and, if so, which one? No    · Have you traveled outside of the [address] [place] [address] in the past 3 months or outside of the [facility] area in the last 2 weeks? No     May we call your Preferred Phone number to discuss your specific medical information? Yes     May we leave a detailed message that includes your specific medical information? Yes      Today's Chief Concerns:   Concern #1:  Spots of concern    Past Medical History:  Have you personally ever had or currently have any of the following? · Skin cancer (such as Melanoma, Basal Cell Carcinoma, Squamous Cell Carcinoma? (If Yes, please provide more detail)- No  · Eczema: No  · Psoriasis: No  · HIV/AIDS: No  · Hepatitis B or C: No  · Tuberculosis: No  · Systemic Immunosuppression such as Diabetes, Biologic or Immunotherapy, Chemotherapy, Organ Transplantation, Bone Marrow Transplantation (If YES, please provide more detail): No  · Radiation Treatment (If YES, please provide more detail): No  · Any other major medical conditions/concerns? (If Yes, which types)- No      Family History:  Have any of your "first degree relatives" (parent, brother, sister, or child) had any of the following       · Skin cancer such as Melanoma or Merkel Cell Carcinoma or Pancreatic Cancer? No  · Eczema, Asthma, Hay Fever or Seasonal Allergies: No  · Psoriasis or Psoriatic Arthritis: No  · Do any other medical conditions seem to run in your family? If Yes, what condition and which relatives? No    Current Medications:       Current Outpatient Medications:     Tranexamic Acid 650 MG TABS, take 2 tablets by mouth three times a day for 5 days, Disp: , Rfl:       Review of Systems:  Have you recently had or currently have any of the following?   If YES, what are you doing for the problem? · Fever, chills or unintended weight loss: No  · Sudden loss or change in your vision: No  · Nausea, vomiting or blood in your stool: No  · Painful or swollen joints: No  · Wheezing or cough: No  · Changing mole or non-healing wound: No  · Nosebleeds: No  · Excessive sweating: No  · Easy or prolonged bleeding? No  · Over the last 2 weeks, how often have you been bothered by the following problems? · Taking little interest or pleasure in doing things: 1 - Not at All  · Feeling down, depressed, or hopeless: 1 - Not at All  · Rapid heartbeat with epinephrine:  No    · FEMALES ONLY:    · Are you pregnant or planning to become pregnant? No  · Are you currently or planning to be nursing or breast feeding? No    · Any known allergies? No Known Allergies      Physical Exam:     Was a chaperone (Derm Clinical Assistant) present throughout the entire Physical Exam? Yes     Did the Dermatology Team specifically  the patient on the importance of a Full Skin Exam to be sure that nothing is missed clinically?  Yes}  o Did the patient ultimately request or accept a Full Skin Exam?  NO  o Did the patient specifically refuse to have the areas "under-the-bra" examined by the Dermatologist? No  o Did the patient specifically refuse to have the areas "under-the-underwear" examined by the Dermatologist? No    CONSTITUTIONAL:   Vitals:    05/02/22 1513   Temp: 98 9 °F (37 2 °C)   TempSrc: Temporal   Weight: 57 6 kg (126 lb 15 8 oz)   Height: 5' 7 5" (1 715 m)       PSYCH: Normal mood and affect  EYES: Normal conjunctiva  ENT: Normal lips and oral mucosa  CARDIOVASCULAR: No edema  RESPIRATORY: Normal respirations  HEME/LYMPH/IMMUNO:  No regional lymphadenopathy except as noted below in "ASSESSMENT AND PLAN BY DIAGNOSIS"    SKIN:  FULL ORGAN SYSTEM EXAM   Back/Spine Normal except as noted below in Assessment        Assessment and Plan by Diagnosis:    History of Present Condition:     Duration:  How long has this been an issue for you?    o  Years   Location Affected:  Where on the body is this affecting you?    o  Back, armpit, and neck   Quality:  Is there any bleeding, pain, itch, burning/irritation, or redness associated with the skin lesion?    o  Denies   Severity:  Describe any bleeding, pain, itch, burning/irritation, or redness on a scale of 1 to 10 (with 10 being the worst)  o  0   Timing:  Does this condition seem to be there pretty constantly or do you notice it more at specific times throughout the day?    o  Constant   Context:  Have you ever noticed that this condition seems to be associated with specific activities you do?    o  Denies   Modifying Factors:    o Anything that seems to make the condition worse?    -  Denies  o What have you tried to do to make the condition better? -  Denies      1  ACROCHORDON ("SKIN TAG")    Physical Exam:   Anatomic Location Affected:  Left shoulder   Morphological Description:  Skin colored papule    Additional History of Present Condition:  Patient stated she has had this spot for several years  Patient mentioned wanting the spot removed as it bothers her by appearance  Assessment and Plan:  Based on a thorough discussion of this condition and the management approach to it (including a comprehensive discussion of the known risks, side effects and potential benefits of treatment), the patient (family) agrees to implement the following specific plan:   Benign; Reassured        Skin tags are common, soft, harmless skin lesions that are also called, in the appropriate settings, papillomas, fibroepithelial polyps, and soft fibromas  They are made up of loosely arranged collagen fibers and blood vessels surrounded by a thickened or thinned-out epidermis  Skin tags tend to develop in both men and women as we grow older  They are usually found on the skin folds (neck, armpits, groin)  It is not known what specifically causes skin tags    Certain factors, though, do appear to play a role:   Chaffing and irritation from skin rubbing together   High levels of growth factors (as seen, for example, in pregnancy or in acromegaly/gigantism)   Insulin resistance   Human papillomavirus (wart virus)    We discussed that most skin tags do not need to be treated unless they are specifically causing the patient physical distress or limitation or pose a risk for a larger problem such as an infection that forms secondary to excoriation or chronic irritation  We had a thorough discussion of treatment options and specific risks (including that any procedural treatment may not be covered by insurance and would then be the patient's responsibility) and benefits/alternatives including but not limited to the following:   Cryotherapy (freezing)   Shave removal   Surgical excision (snip excision with scissors)   Electrosurgery   Ligation (we do not do this procedure and counseled against it due to risk of tissue necrosis and infection)      2  CHERRY ANGIOMA    Physical Exam:   Anatomic Location Affected:  Back   Morphological Description:  Cherry red, 4 mm papule  Common growth of blood vessels  Photo shown below  Additional History of Present Condition:  Patient stated she has had this spot on her back for several years  Patient wanted all her spots removed today  Patient states the spot is very irritated, bothersome, and catches onto clothing  Patient states the area bleeds a lot and sometimes doesn't stop  Assessment and Plan:  Based on a thorough discussion of this condition and the management approach to it (including a comprehensive discussion of the known risks, side effects and potential benefits of treatment), the patient (family) agrees to implement the following specific plan:   Discussed wanting the spot removed  Patient is aware that the spot is considered cosmetic; medically not necessary  Patient was informed that it is $250 per lesion   Patient malika (1) lesion on the back taken off  Assessment and Plan:    Cherry angioma, also known as [employer] spots, are benign vascular skin lesions  A "cherry angioma" is a firm red, blue or purple papule, 0 1-1 cm in diameter  When thrombosed, they can appear black in colour until evaluated with a dermatoscope when the red or purple colour is more easily seen  Cherry angioma may develop on any part of the body but most often appear on the scalp, face, lips and trunk  An angioma is due to proliferating endothelial cells; these are the cells that line the inside of a blood vessel  Angiomas can arise in early life or later in life; the most common type of angioma is a cherry angioma  Cherry angiomas are very common in males and females of any age or race  They are more noticeable in white skin than in skin of colour  They markedly increase in number from about the age of 36  There may be a family history of similar lesions  Eruptive cherry angiomas have been rarely reported to be associated with internal malignancy  The cause of angiomas is unknown  Genetic analysis of cherry angiomas has shown that they frequently carry specific somatic missense mutations in the GNAQ and GNA11 (Q209H) genes, which are involved in other vascular and melanocytic proliferations  Cherry angioma is usually diagnosed clinically and no investigations are necessary for the majority of lesions  It has a characteristic red-clod or lobular pattern on dermatoscopy (called lacunar pattern using conventional pattern analysis)  When there is uncertainty about the diagnosis, a biopsy may be performed  The angioma is composed of venules in a thickened papillary dermis  Collagen bundles may be prominent between the lobules  Cherry angiomas are harmless, so they do not usually have to be treated   Occasionally, they are removed to exclude a malignant skin lesion such as a nodular melanoma or because they are irritated or bleeding (and a subsequent risk for infection)  To decrease friction over the lesions, we recommend Neutrogena Daily Defense SPF 50+ at least 3 times a day      Scribe Attestation    I,:  Haylie Lockhart am acting as a scribe while in the presence of the attending physician :       I,:  Jose Luis Sweeney MD personally performed the services described in this documentation    as scribed in my presence :

## 2022-10-11 PROBLEM — Z12.4 SCREENING FOR CERVICAL CANCER: Status: RESOLVED | Noted: 2022-02-22 | Resolved: 2022-10-11

## 2022-10-11 PROBLEM — Z13.6 SCREENING FOR CARDIOVASCULAR CONDITION: Status: RESOLVED | Noted: 2022-02-22 | Resolved: 2022-10-11

## 2023-02-21 ENCOUNTER — OFFICE VISIT (OUTPATIENT)
Dept: INTERNAL MEDICINE CLINIC | Facility: CLINIC | Age: 53
End: 2023-02-21

## 2023-02-21 ENCOUNTER — TELEPHONE (OUTPATIENT)
Dept: INTERNAL MEDICINE CLINIC | Facility: CLINIC | Age: 53
End: 2023-02-21

## 2023-02-21 VITALS
HEART RATE: 74 BPM | BODY MASS INDEX: 21.5 KG/M2 | DIASTOLIC BLOOD PRESSURE: 70 MMHG | SYSTOLIC BLOOD PRESSURE: 130 MMHG | TEMPERATURE: 98.2 F | WEIGHT: 137 LBS | OXYGEN SATURATION: 98 % | HEIGHT: 67 IN | RESPIRATION RATE: 12 BRPM

## 2023-02-21 DIAGNOSIS — J22 LOWER RESP. TRACT INFECTION: ICD-10-CM

## 2023-02-21 DIAGNOSIS — J00 ACUTE RHINITIS: Primary | ICD-10-CM

## 2023-02-21 RX ORDER — AZITHROMYCIN 250 MG/1
TABLET, FILM COATED ORAL
Qty: 6 TABLET | Refills: 0 | Status: SHIPPED | OUTPATIENT
Start: 2023-02-21 | End: 2023-02-25

## 2023-02-21 NOTE — PROGRESS NOTES
Assessment/Plan:    No problem-specific Assessment & Plan notes found for this encounter  Diagnoses and all orders for this visit:    Acute rhinitis    Lower resp  tract infection  -     azithromycin (ZITHROMAX) 250 mg tablet; Take 2 tablets today then 1 tablet daily x 4 days          Subjective:      Patient ID: Zaida Fonseca is a 47 y o  female  Runny nose for 2 weeks   covid neg 2 days ago  No  Fever mucous  Yellow  Work peds      The following portions of the patient's history were reviewed and updated as appropriate: allergies, current medications, past family history, past medical history, past social history, past surgical history, and problem list     Review of Systems   Constitutional: Negative for activity change, fatigue and fever  HENT: Positive for rhinorrhea  Negative for ear discharge, ear pain and sore throat  Eyes: Negative for pain and visual disturbance  Respiratory: Positive for cough  Negative for shortness of breath  Cardiovascular: Negative for chest pain and leg swelling  Gastrointestinal: Negative for abdominal pain, constipation and diarrhea  Endocrine: Negative for cold intolerance and polyuria  Genitourinary: Negative for flank pain and hematuria  Musculoskeletal: Negative for back pain and joint swelling  Skin: Negative for pallor and wound  Neurological: Negative for dizziness, seizures and speech difficulty  Psychiatric/Behavioral: Negative for confusion and hallucinations  Objective:      /70   Pulse 74   Temp 98 2 °F (36 8 °C)   Resp 12   Ht 5' 7" (1 702 m)   Wt 62 1 kg (137 lb)   SpO2 98%   BMI 21 46 kg/m²          Physical Exam  Vitals and nursing note reviewed  Constitutional:       General: She is not in acute distress  Appearance: Normal appearance  She is not ill-appearing  HENT:      Head: Normocephalic  Right Ear: External ear normal  There is no impacted cerumen        Left Ear: External ear normal  There is no impacted cerumen  Nose: No congestion or rhinorrhea  Mouth/Throat:      Pharynx: No posterior oropharyngeal erythema  Eyes:      General: No scleral icterus  Right eye: No discharge  Left eye: No discharge  Neck:      Vascular: No carotid bruit  Cardiovascular:      Rate and Rhythm: Normal rate and regular rhythm  Heart sounds: Normal heart sounds  No murmur heard  No friction rub  No gallop  Pulmonary:      Effort: No respiratory distress  Breath sounds: Wheezing present  No rhonchi  Abdominal:      General: There is no distension  Tenderness: There is no abdominal tenderness  There is no guarding  Musculoskeletal:         General: No swelling  Cervical back: No rigidity  Right lower leg: No edema  Left lower leg: No edema  Lymphadenopathy:      Cervical: No cervical adenopathy  Skin:     Coloration: Skin is not jaundiced  Neurological:      Mental Status: She is alert  Cranial Nerves: No cranial nerve deficit  Motor: No weakness        Coordination: Coordination normal    Psychiatric:         Mood and Affect: Mood normal        scant wheeze good air movement

## 2023-04-04 ENCOUNTER — ANNUAL EXAM (OUTPATIENT)
Dept: OBGYN CLINIC | Facility: CLINIC | Age: 53
End: 2023-04-04

## 2023-04-04 VITALS
BODY MASS INDEX: 21.86 KG/M2 | HEIGHT: 66 IN | SYSTOLIC BLOOD PRESSURE: 112 MMHG | DIASTOLIC BLOOD PRESSURE: 68 MMHG | WEIGHT: 136 LBS

## 2023-04-04 DIAGNOSIS — Z12.31 ENCOUNTER FOR SCREENING MAMMOGRAM FOR MALIGNANT NEOPLASM OF BREAST: ICD-10-CM

## 2023-04-04 DIAGNOSIS — Z01.419 ENCOUNTER FOR ANNUAL ROUTINE GYNECOLOGICAL EXAMINATION: Primary | ICD-10-CM

## 2023-04-04 RX ORDER — DIPHENOXYLATE HYDROCHLORIDE AND ATROPINE SULFATE 2.5; .025 MG/1; MG/1
1 TABLET ORAL DAILY
COMMUNITY

## 2023-04-05 NOTE — PROGRESS NOTES
Assessment/Plan:    1  Encounter for screening mammogram for malignant neoplasm of breast    - Mammo screening bilateral w 3d & cad; Future    2  Encounter for annual routine gynecological examination          Tom Butterfield is a 47 y o  female who presents for annual exam  She denies any changes with bleeding or cramping  Urinary frequency still happens at night  Current contraception: vasectomy  History of abnormal Pap smear: no  Regular self breast exam: yes  History of abnormal mammogram: no  History of abnormal lipids: no    Menstrual History:  OB History        1    Para   1    Term   1            AB        Living   1       SAB        IAB        Ectopic        Multiple        Live Births   1                  Patient's last menstrual period was 2023 (within days)  Period Cycle (Days): 28  Period Duration (Days): 4  Period Pattern: Regular  Menstrual Flow: Heavy, Light (2 heavy)  Menstrual Control: Tampon, Maxi pad, Hospital pad  Menstrual Control Change Freq (Hours):  1  Dysmenorrhea: None    Past Medical History:   Diagnosis Date   • Fibroid    • Herpes        Family History   Problem Relation Age of Onset   • Hypertension Mother    • Breast cancer Mother         52's   • Diabetes Mother    • Parkinsonism Father    • Stroke Sister    • No Known Problems Maternal Grandmother    • No Known Problems Maternal Grandfather    • No Known Problems Paternal Grandmother    • No Known Problems Paternal Grandfather    • Stroke Sister    • No Known Problems Half-Sister    • No Known Problems Maternal Aunt    • No Known Problems Maternal Aunt    • No Known Problems Maternal Aunt    • No Known Problems Maternal Aunt    • No Known Problems Maternal Aunt    • No Known Problems Maternal Aunt    • No Known Problems Maternal Aunt    • No Known Problems Paternal Aunt    • Heart disease Son        The following portions of the patient's history were reviewed and updated as appropriate: "allergies, current medications, past family history, past medical history, past social history, past surgical history and problem list     Review of Systems  Pertinent items are noted in HPI  Objective      /68 (BP Location: Right arm, Patient Position: Sitting, Cuff Size: Large)   Ht 5' 6 25\" (1 683 m)   Wt 61 7 kg (136 lb)   LMP 03/07/2023 (Within Days)   BMI 21 79 kg/m²     General:   alert and oriented, in no acute distress   Heart:  Breasts: regular rate and rhythm   appear normal, no suspicious masses, no skin or nipple changes or axillary nodes     Lungs: effort normal   Abdomen: soft, non-tender, without masses or organomegaly   Vulva: normal   Vagina: normal mucosa   Cervix: no lesions   Uterus: bulky, mobile, non-tender   Adnexa: normal adnexa and no mass, fullness, tenderness             "

## 2023-06-14 ENCOUNTER — APPOINTMENT (OUTPATIENT)
Dept: LAB | Facility: CLINIC | Age: 53
End: 2023-06-14

## 2023-06-14 DIAGNOSIS — Z00.8 OTHER SPECIFIED GENERAL MEDICAL EXAMINATION: ICD-10-CM

## 2023-06-14 LAB
CHOLEST SERPL-MCNC: 172 MG/DL
HDLC SERPL-MCNC: 83 MG/DL
LDLC SERPL CALC-MCNC: 63 MG/DL (ref 0–100)
NONHDLC SERPL-MCNC: 89 MG/DL
TRIGL SERPL-MCNC: 129 MG/DL

## 2023-06-14 PROCEDURE — 83036 HEMOGLOBIN GLYCOSYLATED A1C: CPT

## 2023-06-14 PROCEDURE — 80061 LIPID PANEL: CPT

## 2023-06-14 PROCEDURE — 36415 COLL VENOUS BLD VENIPUNCTURE: CPT

## 2023-06-15 LAB
EST. AVERAGE GLUCOSE BLD GHB EST-MCNC: 94 MG/DL
HBA1C MFR BLD: 4.9 %

## 2023-10-10 ENCOUNTER — HOSPITAL ENCOUNTER (OUTPATIENT)
Dept: MAMMOGRAPHY | Facility: IMAGING CENTER | Age: 53
Discharge: HOME | End: 2023-10-10
Payer: COMMERCIAL

## 2023-10-10 VITALS — BODY MASS INDEX: 20.88 KG/M2 | WEIGHT: 133 LBS | HEIGHT: 67 IN

## 2023-10-10 DIAGNOSIS — Z12.31 ENCOUNTER FOR SCREENING MAMMOGRAM FOR MALIGNANT NEOPLASM OF BREAST: ICD-10-CM

## 2023-10-10 PROCEDURE — 77067 SCR MAMMO BI INCL CAD: CPT

## 2023-10-10 PROCEDURE — 77063 BREAST TOMOSYNTHESIS BI: CPT

## 2023-10-17 ENCOUNTER — OFFICE VISIT (OUTPATIENT)
Dept: INTERNAL MEDICINE CLINIC | Facility: CLINIC | Age: 53
End: 2023-10-17
Payer: COMMERCIAL

## 2023-10-17 VITALS — WEIGHT: 137 LBS | HEIGHT: 67 IN | BODY MASS INDEX: 21.5 KG/M2

## 2023-10-17 DIAGNOSIS — M21.851 HIP DYSPLASIA, ACQUIRED, RIGHT: ICD-10-CM

## 2023-10-17 DIAGNOSIS — M51.26 HNP (HERNIATED NUCLEUS PULPOSUS), LUMBAR: ICD-10-CM

## 2023-10-17 DIAGNOSIS — M25.559 HIP PAIN, UNSPECIFIED LATERALITY: Primary | ICD-10-CM

## 2023-10-17 PROCEDURE — 99213 OFFICE O/P EST LOW 20 MIN: CPT | Performed by: INTERNAL MEDICINE

## 2023-10-17 NOTE — PROGRESS NOTES
Depression Screening and Follow-up Plan: Patient was screened for depression during today's encounter. They screened negative with a PHQ-2 score of 0. Assessment/Plan:    No problem-specific Assessment & Plan notes found for this encounter. Diagnoses and all orders for this visit:    Hip pain, unspecified laterality  -     XR hip/pelv 2-3 vws right if performed; Future  -     Ambulatory Referral to Orthopedic Surgery; Future    HNP (herniated nucleus pulposus), lumbar    Hip dysplasia, acquired, right  -     XR hip/pelv 2-3 vws right if performed; Future  -     Ambulatory Referral to Orthopedic Surgery; Future          Subjective:      Patient ID: Mar Walsh is a 47 y.o. female. R hip  hurts night  1 month  R hip dysplasia R  3/2014  Rides horses    +  henry right  Nl equal leg length        The following portions of the patient's history were reviewed and updated as appropriate: allergies, current medications, past family history, past medical history, past social history, past surgical history, and problem list.    Review of Systems   Constitutional:  Negative for activity change and fatigue. HENT:  Negative for ear discharge, ear pain, rhinorrhea and sore throat. Eyes:  Negative for pain and visual disturbance. Respiratory:  Negative for cough and shortness of breath. Cardiovascular:  Negative for chest pain and leg swelling. Gastrointestinal:  Negative for abdominal pain, constipation and diarrhea. Endocrine: Negative for cold intolerance and polyuria. Genitourinary:  Negative for flank pain and hematuria. Musculoskeletal:  Negative for back pain and joint swelling. Skin:  Negative for pallor and wound. Neurological:  Negative for dizziness, seizures and speech difficulty. Psychiatric/Behavioral:  Negative for confusion and hallucinations.           Objective:      Ht 5' 7" (1.702 m)   Wt 62.1 kg (137 lb)   LMP 09/10/2023 (Exact Date) Comment: ncop per pt  BMI 21.46 kg/m²          Physical Exam  Vitals and nursing note reviewed. Constitutional:       General: She is not in acute distress. Appearance: Normal appearance. She is not ill-appearing. HENT:      Head: Normocephalic. Right Ear: External ear normal. There is no impacted cerumen. Left Ear: External ear normal. There is no impacted cerumen. Nose: No congestion or rhinorrhea. Mouth/Throat:      Pharynx: No posterior oropharyngeal erythema. Eyes:      General: No scleral icterus. Right eye: No discharge. Left eye: No discharge. Neck:      Vascular: No carotid bruit. Cardiovascular:      Rate and Rhythm: Normal rate and regular rhythm. Heart sounds: Normal heart sounds. No murmur heard. No friction rub. No gallop. Pulmonary:      Breath sounds: No wheezing or rhonchi. Abdominal:      General: There is no distension. Tenderness: There is no abdominal tenderness. There is no guarding. Musculoskeletal:         General: No swelling. Cervical back: No rigidity. Right lower leg: No edema. Left lower leg: No edema. Lymphadenopathy:      Cervical: No cervical adenopathy. Skin:     Coloration: Skin is not jaundiced. Neurological:      Mental Status: She is alert. Cranial Nerves: No cranial nerve deficit. Motor: No weakness.       Coordination: Coordination normal.   Psychiatric:         Mood and Affect: Mood normal.

## 2023-10-18 ENCOUNTER — HOSPITAL ENCOUNTER (OUTPATIENT)
Dept: RADIOLOGY | Facility: HOSPITAL | Age: 53
Discharge: HOME | End: 2023-10-18
Payer: COMMERCIAL

## 2023-10-18 DIAGNOSIS — M25.559 HIP PAIN, UNSPECIFIED LATERALITY: ICD-10-CM

## 2023-10-18 DIAGNOSIS — M21.851 HIP DYSPLASIA, ACQUIRED, RIGHT: ICD-10-CM

## 2023-10-18 PROCEDURE — 73502 X-RAY EXAM HIP UNI 2-3 VIEWS: CPT

## 2023-12-01 ENCOUNTER — OFFICE VISIT (OUTPATIENT)
Dept: OBGYN CLINIC | Facility: CLINIC | Age: 53
End: 2023-12-01
Payer: COMMERCIAL

## 2023-12-01 VITALS
WEIGHT: 142 LBS | SYSTOLIC BLOOD PRESSURE: 113 MMHG | HEIGHT: 67 IN | DIASTOLIC BLOOD PRESSURE: 76 MMHG | BODY MASS INDEX: 22.29 KG/M2 | HEART RATE: 75 BPM

## 2023-12-01 DIAGNOSIS — M25.851 FEMOROACETABULAR IMPINGEMENT OF RIGHT HIP: Primary | ICD-10-CM

## 2023-12-01 DIAGNOSIS — M25.559 HIP PAIN, UNSPECIFIED LATERALITY: ICD-10-CM

## 2023-12-01 DIAGNOSIS — M21.851 HIP DYSPLASIA, ACQUIRED, RIGHT: ICD-10-CM

## 2023-12-01 PROCEDURE — 99243 OFF/OP CNSLTJ NEW/EST LOW 30: CPT | Performed by: STUDENT IN AN ORGANIZED HEALTH CARE EDUCATION/TRAINING PROGRAM

## 2023-12-01 NOTE — PROGRESS NOTES
Hip New Office Note    Assessment:     1. Femoroacetabular impingement of right hip    2. Hip pain, unspecified laterality    3. Hip dysplasia, acquired, right        Plan:     Problem List Items Addressed This Visit          Other    Hip dysplasia, acquired, right    Hip pain     Other Visit Diagnoses       Femoroacetabular impingement of right hip    -  Primary           46 y/o female with right hip pain secondary to RONNIE. Xrays of the right hip reviewed today showing that she has signs consistent with RONNIE. On physical exam she has full range of motion of the hip with pain in forced internal rotation. Discussed that her exam findings and x-ray findings are both consistent with RONNIE. Due to her improvement in symptoms with her current treatment, we do not recommend any surgical intervention at this time. If her symptoms worsen she could consider an intra-articular cortisone injection into the right hip. Continue with conservative treatment options. Follow-up as needed. Subjective:     Patient ID: Tatiana Ulrich is a 47 y.o. female. Patient seen in consultation at the request of Dr. Suresh Machuca DO    Chief Complaint:  HPI:  47 y.o. female who presents today for an evaluation of her RIGHT hip. She states that she started having pain in the right hip about 1 month ago. 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 She states that she recently noted a diagnosis of hip dysplasia on paperwork and inquired about it with her PCP. Due to this diagnosis and her pain, xrays of the right hip were ordered and she was referred to orthopedics. She states that her pain is localized deep in the groin. Her pain has been improving over the last month. She has been treating with an anti-inflammatory diet, natural supplements, Tylenol and Advil as needed.       Allergy:  No Known Allergies  Medications:  all current active meds have been reviewed  Past Medical History:  Past Medical History:   Diagnosis Date    Fibroid     Herpes      Past Surgical History:  Past Surgical History:   Procedure Laterality Date    BREAST CYST ASPIRATION Left 2009    Benign    IR UTERINE ARTERY EMBOLIZATION       Family History:  Family History   Problem Relation Age of Onset    Hypertension Mother     Breast cancer Mother         52's    Diabetes Mother     Parkinsonism Father     Stroke Sister     Stroke Sister     No Known Problems Maternal Grandmother     No Known Problems Maternal Grandfather     No Known Problems Paternal Grandmother     No Known Problems Paternal Grandfather     Heart disease Son     No Known Problems Maternal Aunt     No Known Problems Maternal Aunt     No Known Problems Maternal Aunt     No Known Problems Maternal Aunt     No Known Problems Maternal Aunt     No Known Problems Maternal Aunt     No Known Problems Maternal Aunt     No Known Problems Paternal Aunt     No Known Problems Half-Sister      Social History:  Social History     Substance and Sexual Activity   Alcohol Use Yes    Alcohol/week: 4.0 standard drinks of alcohol    Types: 4 Standard drinks or equivalent per week    Comment: social     Social History     Substance and Sexual Activity   Drug Use Never     Social History     Tobacco Use   Smoking Status Former    Packs/day: 0.25    Years: 5.00    Total pack years: 1.25    Types: Cigarettes    Start date: 1988    Quit date: 3/1/1996    Years since quittin.7   Smokeless Tobacco Never           ROS:  General: Per HPI  Skin: Negative, except if noted below  HEENT: Negative  Respiratory: Negative  Cardiovascular: Negative  Gastrointestinal: Negative  Urinary: Negative  Vascular: Negative  Musculoskeletal: Positive per HPI   Neurologic: Positive per HPI  Endocrine: Negative    Objective:  BP Readings from Last 1 Encounters:   23 113/76      Wt Readings from Last 1 Encounters:   23 64.4 kg (142 lb)        Respiratory:   non-labored respirations    Lymphatics:  no palpable lymph nodes    Gait and Station:   antalgic    Neurologic:   Alert and oriented times 3  Patient with normal sensation except as noted below  Deep tendon reflexes 2+ except as noted in MSK exam    Bilateral Lower Extremity:    Right Hip     Inspection: skin intact    Range of Motion: full with pain in forced IR    - log roll    - Trendelenburg sign    Motor: 5/5 Q/HS/TA/GS/P    Pulses: 2+ DP / 2+ PT    SILT DP/SP/S/S/TN    Imaging:  My interpretation XR AP pelvis/ right hip: mild joint space narrowing, subchondral sclerosis, subchondral cysts, osteophyte formation. No fracture or dislocation. CAM deformity noted. BMI:   Estimated body mass index is 22.24 kg/m² as calculated from the following:    Height as of this encounter: 5' 7" (1.702 m). Weight as of this encounter: 64.4 kg (142 lb). BSA:   Estimated body surface area is 1.75 meters squared as calculated from the following:    Height as of this encounter: 5' 7" (1.702 m). Weight as of this encounter: 64.4 kg (142 lb).            Scribe Attestation      I,:  Pina Almodovar PA-C am acting as a scribe while in the presence of the attending physician.:       I,:  Quin Wood, DO personally performed the services described in this documentation    as scribed in my presence.:

## 2024-02-12 ENCOUNTER — TELEPHONE (OUTPATIENT)
Age: 54
End: 2024-02-12

## 2024-02-12 NOTE — TELEPHONE ENCOUNTER
Caller: Patient    Doctor/Office: POD    Call regarding :  Scheduling for heel pain     Call was transferred to: POD

## 2024-03-05 ENCOUNTER — APPOINTMENT (OUTPATIENT)
Dept: RADIOLOGY | Facility: CLINIC | Age: 54
End: 2024-03-05
Payer: COMMERCIAL

## 2024-03-05 ENCOUNTER — OFFICE VISIT (OUTPATIENT)
Dept: PODIATRY | Facility: CLINIC | Age: 54
End: 2024-03-05
Payer: COMMERCIAL

## 2024-03-05 VITALS
WEIGHT: 145 LBS | BODY MASS INDEX: 22.76 KG/M2 | SYSTOLIC BLOOD PRESSURE: 100 MMHG | HEIGHT: 67 IN | DIASTOLIC BLOOD PRESSURE: 62 MMHG

## 2024-03-05 DIAGNOSIS — M72.2 PLANTAR FASCIITIS: Primary | ICD-10-CM

## 2024-03-05 DIAGNOSIS — M79.672 PAIN IN LEFT FOOT: ICD-10-CM

## 2024-03-05 PROCEDURE — 99202 OFFICE O/P NEW SF 15 MIN: CPT | Performed by: PODIATRIST

## 2024-03-05 PROCEDURE — 73630 X-RAY EXAM OF FOOT: CPT

## 2024-03-05 RX ORDER — MELOXICAM 15 MG/1
15 TABLET ORAL DAILY
Qty: 30 TABLET | Refills: 0 | Status: SHIPPED | OUTPATIENT
Start: 2024-03-05 | End: 2024-04-04

## 2024-03-05 RX ORDER — CHOLECALCIFEROL (VITAMIN D3) 50 MCG
TABLET ORAL
COMMUNITY

## 2024-03-05 RX ORDER — VIT C/B6/B5/MAGNESIUM/HERB 173 50-5-6-5MG
CAPSULE ORAL
COMMUNITY

## 2024-03-05 NOTE — LETTER
" PLANTAR FASCIITIS & HEEL PAIN  TENDONITIS: Achilles,  Posterior Tibial,  Peroneal,  Other……  \"Physical Therapy @ Home\" is absolutely necessary to obtain, and sustain a long lasting resolution of your heel pain or tendonitis.  If you perform the following you can greatly accelerate your recovery and reduce the chance of a recurrence, which is not uncommon.  Don't get frustrated it sometimes takes months to resolve 100%    The longer you have had this problem the longer it takes to resolve it…    MEDICATION:   If prescribed take as instructed with food, never on an empty stomach.  Stop taking if you have any side effects. (ie.Rash, GI upset, Acid Reflux/Heartburn)  Motrin/Ibuprofen or ALEVE/Naproxen   Rx NSAIDS (ie. Mobic, Diclofenac, Celebrex etc)  ICE:   Apply daily for 10 min. intervals, waiting 5 min. before next application.   Three applications over 45 min. should be done after work, or immediately following an athletic/strenuous event.  Frozen water bottles can be used to ice and massage rolling on floor.  No heat unless instructed.  STRETCHING:  Any stretching activity of  your Achilles Tendon/Calf muscles in the lower leg will also stretch your Plantar Fasciia.    Hold stretch for 8-10 sec., Repeat 10-12 times per setting, for (5-6) times per day.    Stand arms length away from a wall, place foot to be stretched half a shoe length behind the other, then step forward with the other performing a lazy push-up against the wall.  MASSAGE:  Deep friction technique using moisturizer for 10-15 min. daily.  Circles/Figure Eight's with Toes reversing direction for Tendonitis  ORTHOTICS:   Can be very helpful but varies based on foot structure   Custom or basic off the shelf products.  Many options available.  Powerstep [place] is an effective OTC device ([employer]).  PROPER SHOE SELECTION:  Shoes must have adequate support and structural integrity.  Forget loafers, flip-flops, and slip on flats!  Lace up type shoes are " recommended due to their ability to fit orthotics/arch supports.  Some athletic type sandals are acceptable.  Running type sneakers are the best choice.   ([place], New Balance 1540 or 940, ASICS, Jose M Adrenaline GTS)  GENERAL ADVICE:   If it hurts slow down or stop the activity.  Avoid certain activities (jumping, climbing ladders, steep inclines/uneven terrain)    Put foot through range of motion if tight prior to getting up after inactivity/resting/sleeping.    Distance running and Treadmills can aggravate the situation and prolong recovery.  Bicycle/Elliptical is better than Jogging/Running.

## 2024-03-05 NOTE — PROGRESS NOTES
Assessment/Plan:  Discussed with patient treatment options for Planter fasciitis/heel pain.  Recommend she start with a conservative approach utilizing stretching, icing, range of motion, anti-inflammatories and shoe gear modification.  Will need x-rays to evaluate underlying bone structure for any possible osseous pathology.  Rx x-ray complete left foot  X-rays personally reviewed show no obvious fracture/dislocation.  Cortical margins intact no severe degenerative arthritis noted.  Small infracalcaneal heel spur noted.  Overall rectus foot structure.  Final radiology interpretation is still pending.  Dispensed instructions on appropriate stretching icing and range of motion.  Rx meloxicam 15 mg daily with food never an empty stomach.  Recommend follow-up in approximately 6 weeks.      No problem-specific Assessment & Plan notes found for this encounter.   Diagnoses and all orders for this visit:    Plantar fasciitis  -     meloxicam (Mobic) 15 mg tablet; Take 1 tablet (15 mg total) by mouth daily    Pain in left foot  -     X-ray foot left 3+ views; Future    Other orders  -     Cholecalciferol (D3) 50 MCG (2000 UT) TABS; Take by mouth  -     Turmeric (Curcumin 95) 500 MG CAPS; Take by mouth          Subjective:      Patient ID: Kathryn Lester is a 53 y.o. female.    3/5/2024: 53-year-old female seen today for initial evaluation painful left heel present for approximately 2 months duration.  Reports over the past 2 weeks her pain has improved.  Denies any injury.    The following portions of the patient's history were reviewed and updated as appropriate: allergies, current medications, past family history, past medical history, past social history, past surgical history, and problem list.    Review of Systems   Constitutional: Negative.    HENT: Negative.     Eyes: Negative.    Respiratory: Negative.     Cardiovascular: Negative.    Gastrointestinal: Negative.    Endocrine: Negative.    Genitourinary: Negative.  "   Musculoskeletal:         Painful left heel x 2 months   Skin: Negative.    Allergic/Immunologic: Negative.    Neurological: Negative.    Hematological: Negative.    Psychiatric/Behavioral: Negative.           Objective:      /62 (BP Location: Right arm, Patient Position: Sitting, Cuff Size: Adult)   Ht 5' 7\" (1.702 m)   Wt 65.8 kg (145 lb)   BMI 22.71 kg/m²          Physical Exam  Constitutional:       Appearance: Normal appearance.   HENT:      Head: Normocephalic.      Right Ear: External ear normal.      Left Ear: External ear normal.   Eyes:      Pupils: Pupils are equal, round, and reactive to light.   Cardiovascular:      Rate and Rhythm: Normal rate.      Pulses:           Dorsalis pedis pulses are 1+ on the right side and 1+ on the left side.        Posterior tibial pulses are 1+ on the right side and 1+ on the left side.   Pulmonary:      Effort: Pulmonary effort is normal.   Musculoskeletal:         General: Tenderness present.      Cervical back: Normal range of motion.      Comments:   Moderate pain with palpation left heel at plantar medial insertion of plantar fascia.  No edema or erythema noted, no pain with subtalar joint or ankle joint range of motion.  Pes cavus foot structure with adequate ankle dorsiflexion available approximately 15 degrees   Feet:      Right foot:      Protective Sensation: 10 sites tested.  10 sites sensed.      Skin integrity: Skin integrity normal.      Toenail Condition: Right toenails are normal.      Left foot:      Protective Sensation: 10 sites tested.  10 sites sensed.      Skin integrity: Skin integrity normal.      Toenail Condition: Left toenails are normal.   Skin:     General: Skin is warm and dry.      Capillary Refill: Capillary refill takes 2 to 3 seconds.   Neurological:      General: No focal deficit present.      Mental Status: She is alert.   Psychiatric:         Mood and Affect: Mood normal.           "

## 2024-03-19 ENCOUNTER — APPOINTMENT (OUTPATIENT)
Dept: LAB | Facility: CLINIC | Age: 54
End: 2024-03-19

## 2024-03-19 DIAGNOSIS — Z00.8 HEALTH EXAMINATION IN POPULATION SURVEY: ICD-10-CM

## 2024-03-19 LAB
CHOLEST SERPL-MCNC: 176 MG/DL
EST. AVERAGE GLUCOSE BLD GHB EST-MCNC: 105 MG/DL
HBA1C MFR BLD: 5.3 %
HDLC SERPL-MCNC: 79 MG/DL
LDLC SERPL CALC-MCNC: 70 MG/DL (ref 0–100)
NONHDLC SERPL-MCNC: 97 MG/DL
TRIGL SERPL-MCNC: 135 MG/DL

## 2024-03-19 PROCEDURE — 83036 HEMOGLOBIN GLYCOSYLATED A1C: CPT

## 2024-03-19 PROCEDURE — 36415 COLL VENOUS BLD VENIPUNCTURE: CPT

## 2024-03-19 PROCEDURE — 80061 LIPID PANEL: CPT

## 2024-06-13 ENCOUNTER — OFFICE VISIT (OUTPATIENT)
Dept: URGENT CARE | Facility: CLINIC | Age: 54
End: 2024-06-13
Payer: COMMERCIAL

## 2024-06-13 VITALS
TEMPERATURE: 97.9 F | HEIGHT: 68 IN | WEIGHT: 144.2 LBS | RESPIRATION RATE: 18 BRPM | DIASTOLIC BLOOD PRESSURE: 68 MMHG | HEART RATE: 60 BPM | SYSTOLIC BLOOD PRESSURE: 112 MMHG | BODY MASS INDEX: 21.86 KG/M2 | OXYGEN SATURATION: 96 %

## 2024-06-13 DIAGNOSIS — D23.5 DERMOID CYST OF SKIN OF BACK: Primary | ICD-10-CM

## 2024-06-13 PROCEDURE — 99213 OFFICE O/P EST LOW 20 MIN: CPT

## 2024-06-13 RX ORDER — CEPHALEXIN 500 MG/1
500 CAPSULE ORAL EVERY 6 HOURS SCHEDULED
Qty: 40 CAPSULE | Refills: 0 | Status: SHIPPED | OUTPATIENT
Start: 2024-06-13 | End: 2024-06-23

## 2024-06-13 NOTE — PROGRESS NOTES
"  [facility]        NAME: Kathryn Lester is a 53 y.o. female  : 1970    MRN: [id number]  DATE: 2024  TIME: 9:22 AM    Assessment and Plan   Dermoid cyst of skin of back [D23.5]  1. Dermoid cyst of skin of back  cephalexin (KEFLEX) 500 mg capsule    Ambulatory Referral to General Surgery            Patient Instructions       Follow up with PCP in 3-5 days.  Proceed to  ER if symptoms worsen.    If tests have been performed at [facility], our office will contact you with results if changes need to be made to the care plan discussed with you at the visit.  You can review your full results on [facility].    Chief Complaint     Chief Complaint   Patient presents with    Abscess     Patient reports cyst to back that has gotten worse over past 2 weeks, reports it is sore, denies drainage, denies fever         History of Present Illness       54 y/o F presents for \"cyst on the back\" x 1 year.  Located on her right upper back. Recently started to bother her more and made an appointment with Derm.  Dermatology was unable to get her in and she is now presenting with increasing pain and pressure at the cyst site.        Review of Systems   Review of Systems   Constitutional:  Negative for chills, fatigue and fever.   Respiratory:  Negative for shortness of breath.    Cardiovascular:  Negative for chest pain.   Gastrointestinal:  Negative for nausea and vomiting.   Musculoskeletal:  Positive for myalgias.         Current Medications       Current Outpatient Medications:     cephalexin (KEFLEX) 500 mg capsule, Take 1 capsule (500 mg total) by mouth every 6 (six) hours for 10 days, Disp: 40 capsule, Rfl: 0    BIOTIN PO, Take by mouth, Disp: , Rfl:     Cholecalciferol (D3) 50 MCG (2000) TABS, Take by mouth, Disp: , Rfl:     MAGNESIUM PO, Take by mouth, Disp: , Rfl:     meloxicam (Mobic) 15 mg tablet, Take 1 tablet (15 mg total) by mouth daily, Disp: 30 tablet, Rfl: 0    Multiple " "Vitamins-Minerals (VITAMIN D3 COMPLETE PO), Take by mouth, Disp: , Rfl:     Turmeric (Curcumin 95) 500 MG CAPS, Take by mouth, Disp: , Rfl:     Current Allergies     Allergies as of 06/13/2024    (No Known Allergies)            The following portions of the patient's history were reviewed and updated as appropriate: allergies, current medications, past family history, past medical history, past social history, past surgical history and problem list.     Past Medical History:   Diagnosis Date    Fibroid     Herpes        Past Surgical History:   Procedure Laterality Date    BREAST CYST ASPIRATION Left 2009    Benign    IR UTERINE ARTERY EMBOLIZATION         Family History   Problem Relation Age of Onset    Hypertension Mother     Breast cancer Mother         50's    Diabetes Mother     Parkinsonism Father     Stroke Sister     Stroke Sister     No Known Problems Maternal Grandmother     No Known Problems Maternal Grandfather     No Known Problems Paternal Grandmother     No Known Problems Paternal Grandfather     Heart disease Son     No Known Problems Maternal Aunt     No Known Problems Maternal Aunt     No Known Problems Maternal Aunt     No Known Problems Maternal Aunt     No Known Problems Maternal Aunt     No Known Problems Maternal Aunt     No Known Problems Maternal Aunt     No Known Problems Paternal Aunt     No Known Problems Half-Sister          Medications have been verified.        Objective   /68 (BP Location: Right arm, Patient Position: Sitting)   Pulse 60   Temp 97.9 °F (36.6 °C) (Tympanic)   Resp 18   Ht 5' 8\" (1.727 m)   Wt 65.4 kg (144 lb 3.2 oz)   SpO2 96%   BMI 21.93 kg/m²   No LMP recorded. Patient is perimenopausal.       Physical Exam     Physical Exam  Vitals and nursing note reviewed.   Constitutional:       General: She is not in acute distress.     Appearance: Normal appearance. She is not toxic-appearing.   HENT:      Head: Normocephalic and atraumatic.   Eyes:      " Conjunctiva/sclera: Conjunctivae normal.   Pulmonary:      Effort: Pulmonary effort is normal.   Skin:     Comments: Erythematous raised 2 cm dermoid cyst to right medial scapula   Neurological:      Mental Status: She is alert.   Psychiatric:         Mood and Affect: Mood normal.         Behavior: Behavior normal.

## 2024-06-14 ENCOUNTER — OFFICE VISIT (OUTPATIENT)
Dept: SURGERY | Facility: HOSPITAL | Age: 54
End: 2024-06-14
Payer: COMMERCIAL

## 2024-06-14 VITALS
BODY MASS INDEX: 21.58 KG/M2 | WEIGHT: 142.4 LBS | HEART RATE: 59 BPM | HEIGHT: 68 IN | TEMPERATURE: 97.5 F | SYSTOLIC BLOOD PRESSURE: 109 MMHG | DIASTOLIC BLOOD PRESSURE: 73 MMHG

## 2024-06-14 DIAGNOSIS — D23.5 DERMOID CYST OF SKIN OF BACK: ICD-10-CM

## 2024-06-14 PROCEDURE — 99203 OFFICE O/P NEW LOW 30 MIN: CPT | Performed by: PHYSICIAN ASSISTANT

## 2024-06-14 NOTE — PROGRESS NOTES
Assessment/Plan:   Kathryn Lester is a 53 y.o.female who is here for Consult (CONSULT//DERMOID CYST ON BACK //PT is here for a consult regarding a dermoid cyst on the right side of her back, she has had it for about 1 year and after 2 weeks it has grown in size a bit and has increasing pain and pressure. She was at urgent care on 06/13/24 and was given anabiotics, but she hasn't stared them yet. )      Right upper back cyst  -present x 1 year, no h/o infection or drainage procedure  -recently larger and more tender  -there is a raised approximate 3-4 cm mass on right upper back with single skin tract consistent with underlying cyst.  No significant tenderness.  No erythema, induration, or other skin changes to suggest infection.  -Discussed excision of cyst cavity.  This procedure can be done in the office. Will schedule for office procedure at patient's convenience  -All questions answered. Should monitor for sign of infection, no ABx required at this time      HPI:  Kathryn Lester is a 53 y.o.female who was referred for evaluation of Consult (CONSULT//DERMOID CYST ON BACK //PT is here for a consult regarding a dermoid cyst on the right side of her back, she has had it for about 1 year and after 2 weeks it has grown in size a bit and has increasing pain and pressure. She was at urgent care on 06/13/24 and was given anabiotics, but she hasn't stared them yet. )      Patient with 1 yr h/o of right upper back cyst.  Cyst with occasional tenderness.  Has recently increased in size.  Painful with pressure or rubbing on area.   Was given ABx at urgent care but did not take them.  No drainage from site.  No fevers or chills.  No prior I&D at this site.  Does have h/o previous neck cyst.  No h/o DM or smoking. No anticoagulation medications  Interested in excision of cyst.     ROS:  General ROS: negative  negative for - chills, fatigue, fever or night sweats, weight loss  Respiratory ROS: no cough, shortness of  breath, or wheezing  Cardiovascular ROS: no chest pain or dyspnea on exertion  Abdomen ROS: no pain, N/V  Genito-Urinary ROS: no dysuria, trouble voiding, or hematuria  Musculoskeletal ROS: negative for - gait disturbance, joint pain or muscle pain  Neurological ROS: no TIA or stroke symptoms  Skin ROS: See HPI    ALLERGIES  Patient has no known allergies.    Current Outpatient Medications:     BIOTIN PO, Take by mouth, Disp: , Rfl:     cephalexin (KEFLEX) 500 mg capsule, Take 1 capsule (500 mg total) by mouth every 6 (six) hours for 10 days, Disp: 40 capsule, Rfl: 0    Cholecalciferol (D3) 50 MCG (2000 UT) TABS, Take by mouth, Disp: , Rfl:     MAGNESIUM PO, Take by mouth, Disp: , Rfl:     Multiple Vitamins-Minerals (VITAMIN D3 COMPLETE PO), Take by mouth, Disp: , Rfl:     Turmeric (Curcumin 95) 500 MG CAPS, Take by mouth, Disp: , Rfl:     meloxicam (Mobic) 15 mg tablet, Take 1 tablet (15 mg total) by mouth daily, Disp: 30 tablet, Rfl: 0  Past Medical History:   Diagnosis Date    Fibroid     Herpes      Past Surgical History:   Procedure Laterality Date    BREAST CYST ASPIRATION Left 2009    Benign    IR UTERINE ARTERY EMBOLIZATION       Family History   Problem Relation Age of Onset    Hypertension Mother     Breast cancer Mother         50's    Diabetes Mother     Parkinsonism Father     Stroke Sister     Stroke Sister     No Known Problems Maternal Grandmother     No Known Problems Maternal Grandfather     No Known Problems Paternal Grandmother     No Known Problems Paternal Grandfather     Heart disease Son     No Known Problems Maternal Aunt     No Known Problems Maternal Aunt     No Known Problems Maternal Aunt     No Known Problems Maternal Aunt     No Known Problems Maternal Aunt     No Known Problems Maternal Aunt     No Known Problems Maternal Aunt     No Known Problems Paternal Aunt     No Known Problems Half-Sister       reports that she quit smoking about 28 years ago. Her smoking use included  cigarettes. She started smoking about 36 years ago. She has a 2 pack-year smoking history. She has never used smokeless tobacco. She reports current alcohol use of about 4.0 standard drinks of alcohol per week. She reports that she does not use drugs.    PHYSICAL EXAM  Vitals:    06/14/24 1030   BP: 109/73   Pulse: 59   Temp: 97.5 °F (36.4 °C)     Weight (last 2 days)       Date/Time Weight    06/14/24 1030 64.6 (142.4)            General Appearance:    Alert, cooperative, no distress   Head:    Normocephalic without obvious abnormality   Eyes:    Conjunctiva/corneas clear, EOM's intact        Neck:   Supple, no adenopathy, no JVD   Back:     Symmetric, no spinal or CVA tenderness   Lungs:     Clear to auscultation bilaterally, no wheezing or rhonchi   Heart:    Regular rate and rhythm, S1 and S2 normal, no murmur   Abdomen:     Benign, no rebound or guarding.    Extremities:   Extremities normal. No clubbing, cyanosis or edema   Psych:   Normal Affect, AOx3.    Neurologic:  Skin:   CNII-XII intact. Strength symmetric, speech intact    Warm, dry  raised approximate 3-4 cm mass on right upper back with single skin tract consistent with underlying cyst.  No significant tenderness.  No erythema, induration, or other skin changes to suggest infection.           Ann Taveras

## 2024-06-28 ENCOUNTER — PROCEDURE VISIT (OUTPATIENT)
Dept: SURGERY | Facility: HOSPITAL | Age: 54
End: 2024-06-28
Payer: COMMERCIAL

## 2024-06-28 VITALS
SYSTOLIC BLOOD PRESSURE: 122 MMHG | BODY MASS INDEX: 21.73 KG/M2 | HEART RATE: 58 BPM | DIASTOLIC BLOOD PRESSURE: 74 MMHG | TEMPERATURE: 97.1 F | HEIGHT: 68 IN | WEIGHT: 143.4 LBS

## 2024-06-28 DIAGNOSIS — L98.9 SKIN LESION: ICD-10-CM

## 2024-06-28 DIAGNOSIS — L72.0 EPIDERMOID CYST OF SKIN OF BACK: Primary | ICD-10-CM

## 2024-06-28 PROCEDURE — 99213 OFFICE O/P EST LOW 20 MIN: CPT | Performed by: SURGERY

## 2024-06-28 RX ORDER — CEPHALEXIN 500 MG/1
500 CAPSULE ORAL EVERY 6 HOURS SCHEDULED
Qty: 20 CAPSULE | Refills: 0 | Status: SHIPPED | OUTPATIENT
Start: 2024-06-28 | End: 2024-07-03

## 2024-06-28 NOTE — PROGRESS NOTES
Assessment/Plan:   Kathryn Lester is a 54 y.o.female who is here for Procedure (PROCEDURE// EXC RT BACK SIDE DERMOID CYST//PT is here for a procedure to remove a dermoid cyst on her right back side, only experiences discomfort from it at the moment. )  .    Plan: Back cyst and skin lesions - on exam today the back cyst appears inflamed and I would not recommend resection at this time, will rx keflex for 5 day course recommend to apply heat or ice, f/u in four weeks to assess outpt vs OR resection, will hold off on other skin lesion excision until next appt    Preoperative Clearance: None    HPI:  Kathryn Lester is a 54 y.o.female who was referred for evaluation of Procedure (PROCEDURE// EXC RT BACK SIDE DERMOID CYST//PT is here for a procedure to remove a dermoid cyst on her right back side, only experiences discomfort from it at the moment. )  .    Currently back lesions.     ROS:  General ROS: negative  negative for - chills, fatigue, fever or night sweats, weight loss  Respiratory ROS: no cough, shortness of breath, or wheezing  Cardiovascular ROS: no chest pain or dyspnea on exertion  Genito-Urinary ROS: no dysuria, trouble voiding, or hematuria  Musculoskeletal ROS: negative for - gait disturbance, joint pain or muscle pain  Neurological ROS: no TIA or stroke symptoms  Back lesions    [unfilled]  Patient has no known allergies.    Current Outpatient Medications:     BIOTIN PO, Take by mouth, Disp: , Rfl:     cephalexin (KEFLEX) 500 mg capsule, Take 1 capsule (500 mg total) by mouth every 6 (six) hours for 5 days, Disp: 20 capsule, Rfl: 0    Cholecalciferol (D3) 50 MCG (2000 UT) TABS, Take by mouth, Disp: , Rfl:     MAGNESIUM PO, Take by mouth, Disp: , Rfl:     Multiple Vitamins-Minerals (VITAMIN D3 COMPLETE PO), Take by mouth, Disp: , Rfl:     Turmeric (Curcumin 95) 500 MG CAPS, Take by mouth, Disp: , Rfl:     meloxicam (Mobic) 15 mg tablet, Take 1 tablet (15 mg total) by mouth daily, Disp: 30 tablet,  Rfl: 0  Past Medical History:   Diagnosis Date    Fibroid     Herpes      Past Surgical History:   Procedure Laterality Date    BREAST CYST ASPIRATION Left 2009    Benign    IR UTERINE ARTERY EMBOLIZATION       Family History   Problem Relation Age of Onset    Hypertension Mother     Breast cancer Mother         50's    Diabetes Mother     Parkinsonism Father     Stroke Sister     Stroke Sister     No Known Problems Maternal Grandmother     No Known Problems Maternal Grandfather     No Known Problems Paternal Grandmother     No Known Problems Paternal Grandfather     Heart disease Son     No Known Problems Maternal Aunt     No Known Problems Maternal Aunt     No Known Problems Maternal Aunt     No Known Problems Maternal Aunt     No Known Problems Maternal Aunt     No Known Problems Maternal Aunt     No Known Problems Maternal Aunt     No Known Problems Paternal Aunt     No Known Problems Half-Sister       reports that she quit smoking about 28 years ago. Her smoking use included cigarettes. She started smoking about 36 years ago. She has a 2 pack-year smoking history. She has never used smokeless tobacco. She reports current alcohol use of about 4.0 standard drinks of alcohol per week. She reports that she does not use drugs.    Labs:   Lab Results   Component Value Date    WBC 6.17 02/24/2022    WBC 6.87 02/17/2021    HGB 11.5 02/24/2022    HGB 13.1 02/17/2021     02/24/2022     02/17/2021     Lab Results   Component Value Date    ALT 21 02/24/2022    AST 12 02/24/2022     This SmartLink has not been configured with any valid records.       PHYSICAL EXAM  General Appearance:    Alert, cooperative, no distress,    Head:    Normocephalic without obvious abnormality   Eyes:    PERRL, conjunctiva/corneas clear, EOM's intact        Neck:   Supple, no adenopathy, no JVD   Back:     Symmetric, no spinal or CVA tenderness   Lungs:     Clear to auscultation bilaterally, no wheezing or rhonchi   Heart:     "Regular rate and rhythm, S1 and S2 normal, no murmur   Abdomen:        Extremities:   Extremities normal. No clubbing, cyanosis or edema   Psych:   Normal Affect, AOx3.    Neurologic:  Skin:   CNII-XII intact. Strength symmetric, speech intact    Warm, dry, intact, back cyst with some fluctuance mild inflammation 4x3cm, two other sub centimeter back lesions         Physical Exam              Some portions of this record may have been generated with voice recognition software. There may be translation, syntax,  or grammatical errors. Occasional wrong word or \"sound-a-like\" substitutions may have occurred due to the inherent limitations of the voice recognition software. Read the chart carefully and recognize, using context, where substitutions may have occurred. If you have any questions, please contact the dictating provider for clarification or correction, as needed. This encounter has been coded by a non-certified coder.   "

## 2024-07-11 ENCOUNTER — OFFICE VISIT (OUTPATIENT)
Dept: SURGERY | Facility: HOSPITAL | Age: 54
End: 2024-07-11
Payer: COMMERCIAL

## 2024-07-11 VITALS
HEIGHT: 68 IN | WEIGHT: 143.4 LBS | BODY MASS INDEX: 21.73 KG/M2 | HEART RATE: 76 BPM | DIASTOLIC BLOOD PRESSURE: 74 MMHG | SYSTOLIC BLOOD PRESSURE: 106 MMHG

## 2024-07-11 DIAGNOSIS — L72.0 EPIDERMOID CYST OF SKIN OF BACK: Primary | ICD-10-CM

## 2024-07-11 PROCEDURE — 99213 OFFICE O/P EST LOW 20 MIN: CPT | Performed by: SURGERY

## 2024-08-13 ENCOUNTER — OFFICE VISIT (OUTPATIENT)
Dept: PHYSICAL THERAPY | Facility: CLINIC | Age: 54
End: 2024-08-13
Payer: COMMERCIAL

## 2024-08-13 DIAGNOSIS — M72.2 PLANTAR FASCIITIS: ICD-10-CM

## 2024-08-13 DIAGNOSIS — M79.672 LEFT FOOT PAIN: Primary | ICD-10-CM

## 2024-08-13 PROCEDURE — 97140 MANUAL THERAPY 1/> REGIONS: CPT | Performed by: PHYSICAL THERAPIST

## 2024-08-13 PROCEDURE — 97161 PT EVAL LOW COMPLEX 20 MIN: CPT | Performed by: PHYSICAL THERAPIST

## 2024-08-13 NOTE — PROGRESS NOTES
PT Evaluation     Today's date: 2024  Patient name: Kathryn Lester  : 1970  MRN: [id number]  Referring provider: Rosy López PT  Dx:   Encounter Diagnosis     ICD-10-CM    1. Left foot pain  M79.672       2. Plantar fasciitis  M72.2           Start Time: 1615  Stop Time: 1700  Total time in clinic (min): 45 minutes    Assessment  Impairments: abnormal or restricted ROM, activity intolerance, impaired physical strength, lacks appropriate home exercise program and pain with function  Functional limitations: standing; walking  Symptom irritability: moderate    Assessment details: Pt is 55yo female presenting to therapy following chronic heel and plantar fascia pain. Pt has decreased flexibility in calf and big toe. Slight medial heel whip with gait due to decreased flexibility. Pain with palpation on heel and post tib tendon. Pt would benefit from PT services to improve rom ,flexibility, and strength to return to PLOF.  Understanding of Dx/Px/POC: good     Prognosis: good    Goals  1. Pt will be independent with HEP upon discharge.  2. Pt will improve DF rom by 5 degrees for improve gait mechanics.  3. Pt will report less pain in the morning.  4. Pt will report less pain with prolonged standing at work.    Plan  Patient would benefit from: skilled physical therapy    Planned therapy interventions: functional ROM exercises, therapeutic activities, therapeutic exercise, therapeutic training, stretching, strengthening, home exercise program, neuromuscular re-education, manual therapy and patient education    Frequency: 1x week  Treatment plan discussed with: patient      Subjective Evaluation    History of Present Illness  Mechanism of injury: Pt reports Lt foot pain since January. Pt has tried water bottle rolling, over the counter inserts, new Hokas, and anti inflammatories. Pt pain is on medial and lateral aspect of heel, bottom of heel, feels like fire. Pt reports pain is worse in morning or on  your her feet or a while.  Patient Goals  Patient goals for therapy: decreased pain    Pain  Current pain ratin  At worst pain ratin  Location: Lt heel  Quality: burning  Relieving factors: medications  Aggravating factors: walking and standing        Objective     Tenderness   Left Ankle/Foot   Tenderness in the plantar fascia and posterior tibial tendon.     Passive Range of Motion   Left Ankle/Foot    Dorsiflexion (ke): 0 degrees     Right Ankle/Foot    Dorsiflexion (ke): 10 degrees     Strength/Myotome Testing     Left Ankle/Foot   Plantar flexion: 4    Tests   Left Ankle/Foot   Negative for windlass.       Flowsheet Rows      Flowsheet Row Most Recent Value   PT/OT G-Codes    Current Score 69   Projected Score 76               Precautions: none      Manuals             EPAT 3.6barr 15Hz R15            Stm along post tib tendon                                       Neuro Re-Ed                                                                                                        Ther Ex             Gastroc stretch hep            Soleus stretch hep            Ecc heel raises off step hep            Big toe stretch hep                                                                Ther Activity                                       Gait Training                                       Modalities

## 2024-08-16 NOTE — PROGRESS NOTES
Assessment/Plan:   Kathryn Lester is a 54 y.o.female who is here for Follow-up (FOLLOW-UP// DERMOUD CYST//PT is here after completing her round of medication for the dermoid cyst, it has gotten smaller, no pain/discomfort in the area. )  .    Plan: Back cyst - inflammation improving, cont local wound care, would wait few weeks for all inflammation to resolve before proceeding with excision    Preoperative Clearance: None    HPI:  Kathryn Lester is a 54 y.o.female who was referred for evaluation of Follow-up (FOLLOW-UP// DERMOUD CYST//PT is here after completing her round of medication for the dermoid cyst, it has gotten smaller, no pain/discomfort in the area. )  .    Currently back cyst.     ROS:  General ROS: negative  negative for - chills, fatigue, fever or night sweats, weight loss  Respiratory ROS: no cough, shortness of breath, or wheezing  Cardiovascular ROS: no chest pain or dyspnea on exertion  Genito-Urinary ROS: no dysuria, trouble voiding, or hematuria  Musculoskeletal ROS: negative for - gait disturbance, joint pain or muscle pain  Neurological ROS: no TIA or stroke symptoms  Back cyst    [unfilled]  Patient has no known allergies.    Current Outpatient Medications:     BIOTIN PO, Take by mouth, Disp: , Rfl:     Cholecalciferol (D3) 50 MCG (2000 UT) TABS, Take by mouth, Disp: , Rfl:     MAGNESIUM PO, Take by mouth, Disp: , Rfl:     Multiple Vitamins-Minerals (VITAMIN D3 COMPLETE PO), Take by mouth, Disp: , Rfl:     Turmeric (Curcumin 95) 500 MG CAPS, Take by mouth, Disp: , Rfl:     meloxicam (Mobic) 15 mg tablet, Take 1 tablet (15 mg total) by mouth daily, Disp: 30 tablet, Rfl: 0  Past Medical History:   Diagnosis Date    Fibroid     Herpes      Past Surgical History:   Procedure Laterality Date    BREAST CYST ASPIRATION Left 2009    Benign    IR UTERINE ARTERY EMBOLIZATION       Family History   Problem Relation Age of Onset    Hypertension Mother     Breast cancer Mother         50's     Diabetes Mother     Parkinsonism Father     Stroke Sister     Stroke Sister     No Known Problems Maternal Grandmother     No Known Problems Maternal Grandfather     No Known Problems Paternal Grandmother     No Known Problems Paternal Grandfather     Heart disease Son     No Known Problems Maternal Aunt     No Known Problems Maternal Aunt     No Known Problems Maternal Aunt     No Known Problems Maternal Aunt     No Known Problems Maternal Aunt     No Known Problems Maternal Aunt     No Known Problems Maternal Aunt     No Known Problems Paternal Aunt     No Known Problems Half-Sister       reports that she quit smoking about 28 years ago. Her smoking use included cigarettes. She started smoking about 36 years ago. She has a 2 pack-year smoking history. She has never used smokeless tobacco. She reports current alcohol use of about 4.0 standard drinks of alcohol per week. She reports that she does not use drugs.    Labs:   Lab Results   Component Value Date    WBC 6.17 02/24/2022    WBC 6.87 02/17/2021    HGB 11.5 02/24/2022    HGB 13.1 02/17/2021     02/24/2022     02/17/2021     Lab Results   Component Value Date    ALT 21 02/24/2022    AST 12 02/24/2022     This SmartLink has not been configured with any valid records.       PHYSICAL EXAM  General Appearance:    Alert, cooperative, no distress,    Head:    Normocephalic without obvious abnormality   Eyes:    PERRL, conjunctiva/corneas clear, EOM's intact        Neck:   Supple, no adenopathy, no JVD   Back:     Symmetric, no spinal or CVA tenderness   Lungs:     Clear to auscultation bilaterally, no wheezing or rhonchi   Heart:    Regular rate and rhythm, S1 and S2 normal, no murmur   Abdomen:        Extremities:   Extremities normal. No clubbing, cyanosis or edema   Psych:   Normal Affect, AOx3.    Neurologic:  Skin:   CNII-XII intact. Strength symmetric, speech intact    Warm, dry, intact, back cyst inflammation resolving     Physical  "Exam              Some portions of this record may have been generated with voice recognition software. There may be translation, syntax,  or grammatical errors. Occasional wrong word or \"sound-a-like\" substitutions may have occurred due to the inherent limitations of the voice recognition software. Read the chart carefully and recognize, using context, where substitutions may have occurred. If you have any questions, please contact the dictating provider for clarification or correction, as needed. This encounter has been coded by a non-certified coder.   "

## 2024-08-20 ENCOUNTER — OFFICE VISIT (OUTPATIENT)
Dept: PHYSICAL THERAPY | Facility: CLINIC | Age: 54
End: 2024-08-20
Payer: COMMERCIAL

## 2024-08-20 DIAGNOSIS — M72.2 PLANTAR FASCIITIS: ICD-10-CM

## 2024-08-20 DIAGNOSIS — M79.672 LEFT FOOT PAIN: Primary | ICD-10-CM

## 2024-08-20 PROCEDURE — 97140 MANUAL THERAPY 1/> REGIONS: CPT | Performed by: PHYSICAL THERAPIST

## 2024-08-20 NOTE — PROGRESS NOTES
Daily Note     Today's date: 2024  Patient name: Kathryn Lester  : 1970  MRN: [id number]  Referring provider: Rosy López PT  Dx:   Encounter Diagnosis     ICD-10-CM    1. Left foot pain  M79.672       2. Plantar fasciitis  M72.2                      Subjective: Pt reports the following day her foot was sore but then noted improvements with foot pain.      Objective: See treatment diary below      Assessment: Pt tolerating EPAT with increased intensity today. Pt will continue stretches at home. Will continue EPAT next visit.      Plan: Continue per plan of care.      Precautions: none      Manuals            EPAT 3.6barr 15Hz R15 4.0barr 15Hz R15           Stm along post tib tendon                                       Neuro Re-Ed                                                                                                        Ther Ex             Gastroc stretch hep hep           Soleus stretch hep hep           Ecc heel raises off step hep            Big toe stretch hep                                                                Ther Activity                                       Gait Training                                       Modalities

## 2024-08-27 ENCOUNTER — OFFICE VISIT (OUTPATIENT)
Dept: PHYSICAL THERAPY | Facility: CLINIC | Age: 54
End: 2024-08-27
Payer: COMMERCIAL

## 2024-08-27 DIAGNOSIS — M79.672 LEFT FOOT PAIN: Primary | ICD-10-CM

## 2024-08-27 DIAGNOSIS — M72.2 PLANTAR FASCIITIS: ICD-10-CM

## 2024-08-27 PROCEDURE — 97140 MANUAL THERAPY 1/> REGIONS: CPT | Performed by: PHYSICAL THERAPIST

## 2024-08-27 NOTE — PROGRESS NOTES
Daily Note     Today's date: 2024  Patient name: Kathryn Lester  : 1970  MRN: [id number]  Referring provider: Rosy López PT  Dx:   Encounter Diagnosis     ICD-10-CM    1. Left foot pain  M79.672       2. Plantar fasciitis  M72.2                      Subjective: Pt reports good and bad days.      Objective: See treatment diary below      Assessment: Pt tolerated increased EPAT intensity with different head. Also added EPAT to post tib tendon today. Added navicular sling tape to support the post tib tendon. Educated to look into arch support insert in kas. Added ball to heel raises to increase isometric inversion.      Plan: Continue per plan of care.      Precautions: none      Manuals           EPAT 3.6barr 15Hz R15 4.0barr 15Hz R15 4.1barr 15Hz D15          Stm along post tib tendon                                       Neuro Re-Ed                                                                                                        Ther Ex             Gastroc stretch hep hep           Soleus stretch hep hep           Ecc heel raises off step hep  SL hep          Big toe stretch hep            Heel raises w/ ball   hep                                                 Ther Activity                                       Gait Training                                       Modalities

## 2024-09-03 ENCOUNTER — OFFICE VISIT (OUTPATIENT)
Dept: PHYSICAL THERAPY | Facility: CLINIC | Age: 54
End: 2024-09-03
Payer: COMMERCIAL

## 2024-09-03 DIAGNOSIS — M72.2 PLANTAR FASCIITIS: ICD-10-CM

## 2024-09-03 DIAGNOSIS — M79.672 LEFT FOOT PAIN: Primary | ICD-10-CM

## 2024-09-03 PROCEDURE — 97140 MANUAL THERAPY 1/> REGIONS: CPT | Performed by: PHYSICAL THERAPIST

## 2024-09-03 NOTE — PROGRESS NOTES
Daily Note     Today's date: 9/3/2024  Patient name: Kathryn Lester  : 1970  MRN: [id number]  Referring provider: Rosy López PT  Dx:   Encounter Diagnosis     ICD-10-CM    1. Left foot pain  M79.672       2. Plantar fasciitis  M72.2                      Subjective: Pt reports continued improvement.      Objective: See treatment diary below      Assessment: Pt continues to tolerate increased intensity with EPAT. Pt reviewed exercises. Will continue one more EPAT session to focus on post tib tendon.      Plan: Continue per plan of care.      Precautions: none      Manuals 8/13 8/20 8/27 9/3         EPAT 3.6barr 15Hz R15 4.0barr 15Hz R15 4.1barr 15Hz D15 PF+ post tib 4.1barr 15Hz D15 PF+ post tib         Stm along post tib tendon                                       Neuro Re-Ed                                                                                                        Ther Ex             Gastroc stretch hep hep           Soleus stretch hep hep           Ecc heel raises off step hep  SL hep SL hep         Big toe stretch hep            Heel raises w/ ball   hep hep                                                Ther Activity                                       Gait Training                                       Modalities

## 2024-09-10 ENCOUNTER — OFFICE VISIT (OUTPATIENT)
Dept: PHYSICAL THERAPY | Facility: CLINIC | Age: 54
End: 2024-09-10
Payer: COMMERCIAL

## 2024-09-10 DIAGNOSIS — M79.672 LEFT FOOT PAIN: ICD-10-CM

## 2024-09-10 DIAGNOSIS — M72.2 PLANTAR FASCIITIS: Primary | ICD-10-CM

## 2024-09-10 PROCEDURE — 97140 MANUAL THERAPY 1/> REGIONS: CPT | Performed by: PHYSICAL THERAPIST

## 2024-09-10 NOTE — PROGRESS NOTES
Daily Note     Today's date: 9/10/2024  Patient name: Kathryn Lester  : 1970  MRN: [id number]  Referring provider: Rosy López PT  Dx:   Encounter Diagnosis     ICD-10-CM    1. Plantar fasciitis  M72.2       2. Left foot pain  M79.672                      Subjective: Pt  reports some increased medial ankle pain since Saturday. She is also having to work longer today.      Objective: See treatment diary below      Assessment: Pt with increased inflammation along post tib tendon. Pt benefited from EPAT and STM along post tib tendon. Improvement in pain following.      Plan: Continue per plan of care.      Precautions: none      Manuals 8/13 8/20 8/27 9/3 9/10        EPAT 3.6barr 15Hz R15 4.0barr 15Hz R15 4.1barr 15Hz D15 PF+ post tib 4.1barr 15Hz D15 PF+ post tib 4.2barr 15Hz D15 PF+ post tib        Stm along post tib tendon                                       Neuro Re-Ed                                                                                                        Ther Ex             Gastroc stretch hep hep           Soleus stretch hep hep           Ecc heel raises off step hep  SL hep SL hep         Big toe stretch hep            Heel raises w/ ball   hep hep                                                Ther Activity                                       Gait Training                                       Modalities

## 2024-09-17 ENCOUNTER — OFFICE VISIT (OUTPATIENT)
Dept: PHYSICAL THERAPY | Facility: CLINIC | Age: 54
End: 2024-09-17
Payer: COMMERCIAL

## 2024-09-17 DIAGNOSIS — M72.2 PLANTAR FASCIITIS: Primary | ICD-10-CM

## 2024-09-17 DIAGNOSIS — M79.672 LEFT FOOT PAIN: ICD-10-CM

## 2024-09-17 PROCEDURE — 97140 MANUAL THERAPY 1/> REGIONS: CPT

## 2024-09-17 NOTE — PROGRESS NOTES
"Daily Note     Today's date: 2024  Patient name: Ktahryn Lester  : 1970  MRN: [id number]  Referring provider: Rosy López, PT  Dx:   Encounter Diagnosis     ICD-10-CM    1. Plantar fasciitis  M72.2       2. Left foot pain  M79.672           Start Time: 1230  Stop Time: 1245  Total time in clinic (min): 15 minutes      Subjective: Patient reports she was on her feet a lot over the weekend taking trips to the [employer] and the [employer] fair - reports her left foot was sore afterwards however both of her calves were sore as well likely from increased activity.   Patient reports she obtained a compression sleeve which seems to be helping with pain control.  Patient states, \"Overall it's an improvement from where it was.\"      Objective: See treatment diary below.      Assessment: Patient has appropriate pain response to EPAT treatment.      Plan: Continue treatment as per PT plan of care.       Precautions: none      Manuals 8/13 8/20 8/27 9/3 9/10 9/17       EPAT 3.6barr 15Hz R15 4.0barr 15Hz R15 4.1barr 15Hz D15 PF+ post tib 4.1barr 15Hz D15 PF+ post tib 4.2barr 15Hz D15 PF+ post tib 4.6 bar  15 Hz  DI15  PF+ post tib       Stm along post tib tendon                                       Neuro Re-Ed                                                                                                        Ther Ex             Gastroc stretch hep hep           Soleus stretch hep hep           Ecc heel raises off step hep  SL hep SL hep         Big toe stretch hep            Heel raises w/ ball   hep hep                                                Ther Activity                                       Gait Training                                       Modalities                                              "

## 2024-09-24 ENCOUNTER — OFFICE VISIT (OUTPATIENT)
Dept: PHYSICAL THERAPY | Facility: CLINIC | Age: 54
End: 2024-09-24
Payer: COMMERCIAL

## 2024-09-24 DIAGNOSIS — M72.2 PLANTAR FASCIITIS: Primary | ICD-10-CM

## 2024-09-24 DIAGNOSIS — M79.672 LEFT FOOT PAIN: ICD-10-CM

## 2024-09-24 PROCEDURE — 97140 MANUAL THERAPY 1/> REGIONS: CPT | Performed by: PHYSICAL THERAPIST

## 2024-09-24 NOTE — PROGRESS NOTES
Daily Note     Today's date: 2024  Patient name: Kathryn Lester  : 1970  MRN: [id number]  Referring provider: Rosy López PT  Dx:   Encounter Diagnosis     ICD-10-CM    1. Plantar fasciitis  M72.2       2. Left foot pain  M79.672                      Subjective: Pt reports some really good days. Yardwork did increase some symptoms.      Objective: See treatment diary below      Assessment: Pt tolerating EPAT well. Discussed and educated to continue with strengthening exercises for 4-6 weeks. Pt has met most goals, discharge for formal PT services.      Plan: Discharge PT services.     Precautions: none      Manuals 8/13 8/20 8/27 9/3 9/10 9/17 9/24      EPAT 3.6barr 15Hz R15 4.0barr 15Hz R15 4.1barr 15Hz D15 PF+ post tib 4.1barr 15Hz D15 PF+ post tib 4.2barr 15Hz D15 PF+ post tib 4.6 bar  15 Hz  DI15  PF+ post tib 4.6 bar  15 Hz  DI15  PF+ post tib      Stm along post tib tendon                                       Neuro Re-Ed                                                                                                        Ther Ex             Gastroc stretch hep hep           Soleus stretch hep hep           Ecc heel raises off step hep  SL hep SL hep         Big toe stretch hep            Heel raises w/ ball   hep hep                                                Ther Activity                                       Gait Training                                       Modalities

## 2024-10-24 ENCOUNTER — ANNUAL EXAM (OUTPATIENT)
Age: 54
End: 2024-10-24
Payer: COMMERCIAL

## 2024-10-24 VITALS
HEIGHT: 67 IN | DIASTOLIC BLOOD PRESSURE: 64 MMHG | SYSTOLIC BLOOD PRESSURE: 102 MMHG | BODY MASS INDEX: 22.32 KG/M2 | WEIGHT: 142.2 LBS

## 2024-10-24 DIAGNOSIS — Z12.31 ENCOUNTER FOR SCREENING MAMMOGRAM FOR MALIGNANT NEOPLASM OF BREAST: ICD-10-CM

## 2024-10-24 DIAGNOSIS — Z01.419 ENCOUNTER FOR ANNUAL ROUTINE GYNECOLOGICAL EXAMINATION: Primary | ICD-10-CM

## 2024-10-24 PROBLEM — N92.4 EXCESSIVE BLEEDING IN PREMENOPAUSAL PERIOD: Status: RESOLVED | Noted: 2022-02-22 | Resolved: 2024-10-24

## 2024-10-24 PROBLEM — D21.9 FIBROIDS: Status: RESOLVED | Noted: 2022-02-22 | Resolved: 2024-10-24

## 2024-10-24 PROCEDURE — S0612 ANNUAL GYNECOLOGICAL EXAMINA: HCPCS | Performed by: OBSTETRICS & GYNECOLOGY

## 2024-10-24 RX ORDER — AMPICILLIN TRIHYDRATE 250 MG
CAPSULE ORAL
COMMUNITY
Start: 2024-08-01

## 2024-10-24 NOTE — PROGRESS NOTES
Assessment/Plan:    1. Encounter for annual routine gynecological examination      2. Encounter for screening mammogram for malignant neoplasm of breast    - Mammo screening bilateral w 3d and cad; Future        Subjective      Kathryn Lester is a 54 y.o. female who presents for annual exam. Periods have been absent since 2024.  No menopausal symptoms.  Less pressure sensation from fibroids.  She denies any breast, urinary or sexual concerns.    Current contraception: vasectomy  History of abnormal Pap smear: no  Regular self breast exam: yes  History of abnormal mammogram: no  History of abnormal lipids: no    Menstrual History:  OB History          1    Para   1    Term   1       0    AB   0    Living   1         SAB   0    IAB   0    Ectopic   0    Multiple   0    Live Births   1                  Menarche age: 14  Patient's last menstrual period was 2024 (within days).     Past Medical History:   Diagnosis Date    Fibroid     Herpes        Family History   Problem Relation Age of Onset    Hypertension Mother     Breast cancer Mother         50's    Diabetes Mother     Parkinsonism Father     Stroke Sister     Stroke Sister     No Known Problems Maternal Grandmother     No Known Problems Maternal Grandfather     No Known Problems Paternal Grandmother     No Known Problems Paternal Grandfather     Heart disease Son     No Known Problems Maternal Aunt     No Known Problems Maternal Aunt     No Known Problems Maternal Aunt     No Known Problems Maternal Aunt     No Known Problems Maternal Aunt     No Known Problems Maternal Aunt     No Known Problems Maternal Aunt     No Known Problems Paternal Aunt     No Known Problems Half-Sister        The following portions of the patient's history were reviewed and updated as appropriate: allergies, current medications, past family history, past medical history, past social history, past surgical history, and problem list.    Review of  "Systems  Pertinent items are noted in HPI.      Objective      /64 (BP Location: Right arm, Patient Position: Sitting, Cuff Size: Large)   Ht 5' 6.75\" (1.695 m)   Wt 64.5 kg (142 lb 3.2 oz)   LMP 04/30/2024 (Within Days)   BMI 22.44 kg/m²     General:   alert and oriented, in no acute distress   Heart:  Breasts: regular rate and rhythm  appear normal, no suspicious masses, no skin or nipple changes or axillary nodes.   Lungs: Effort normal   Abdomen: soft, non-tender, without masses or organomegaly   Vulva: normal   Vagina: normal mucosa   Cervix: no lesions   Uterus: Enlarged, bulky, nontender   Adnexa:  Bladder: normal adnexa and no mass, fullness, tenderness  Non-tender               "

## 2024-12-09 ENCOUNTER — TELEPHONE (OUTPATIENT)
Age: 54
End: 2024-12-09

## 2025-01-14 ENCOUNTER — APPOINTMENT (OUTPATIENT)
Dept: URGENT CARE | Facility: CLINIC | Age: 55
End: 2025-01-14
Payer: OTHER MISCELLANEOUS

## 2025-01-14 PROCEDURE — 99283 EMERGENCY DEPT VISIT LOW MDM: CPT | Performed by: NURSE PRACTITIONER

## 2025-01-14 PROCEDURE — G0382 LEV 3 HOSP TYPE B ED VISIT: HCPCS | Performed by: NURSE PRACTITIONER

## 2025-02-03 ENCOUNTER — TELEPHONE (OUTPATIENT)
Age: 55
End: 2025-02-03

## 2025-02-14 ENCOUNTER — HOSPITAL ENCOUNTER (OUTPATIENT)
Dept: NEUROLOGY | Facility: CLINIC | Age: 55
End: 2025-02-14
Payer: OTHER MISCELLANEOUS

## 2025-02-14 DIAGNOSIS — R20.8 OTHER DISTURBANCES OF SKIN SENSATION: ICD-10-CM

## 2025-02-14 PROCEDURE — 95886 MUSC TEST DONE W/N TEST COMP: CPT

## 2025-02-14 PROCEDURE — 95911 NRV CNDJ TEST 9-10 STUDIES: CPT

## 2025-02-18 ENCOUNTER — APPOINTMENT (OUTPATIENT)
Dept: URGENT CARE | Facility: CLINIC | Age: 55
End: 2025-02-18
Payer: OTHER MISCELLANEOUS

## 2025-02-18 PROCEDURE — 99213 OFFICE O/P EST LOW 20 MIN: CPT | Performed by: NURSE PRACTITIONER

## 2025-02-24 ENCOUNTER — EVALUATION (OUTPATIENT)
Dept: PHYSICAL THERAPY | Facility: CLINIC | Age: 55
End: 2025-02-24
Payer: OTHER MISCELLANEOUS

## 2025-02-24 DIAGNOSIS — G56.03 BILATERAL CARPAL TUNNEL SYNDROME: Primary | ICD-10-CM

## 2025-02-24 PROCEDURE — 97140 MANUAL THERAPY 1/> REGIONS: CPT | Performed by: PHYSICAL THERAPIST

## 2025-02-24 PROCEDURE — 97161 PT EVAL LOW COMPLEX 20 MIN: CPT | Performed by: PHYSICAL THERAPIST

## 2025-02-24 NOTE — PROGRESS NOTES
PT Evaluation     Today's date: 2025  Patient name: Kathryn Lester  : 1970  MRN: [id number]  Referring provider: Mike Damian CRNP  Dx:   Encounter Diagnosis     ICD-10-CM    1. Bilateral carpal tunnel syndrome  G56.03                      Assessment  Impairments: abnormal or restricted ROM, impaired physical strength, lacks appropriate home exercise program, pain with function and poor posture   Symptom irritability: low    Assessment details: Pt is a 54 y.o. female presenting to PT with chronic B wrist pain with numbness/tingling; EMG findings show mild carpal tunnel syndrome with R cervical radiculopathy affecting C6-C8 nerve roots. PT findings include: Pt does not present with any significant findings related to her carpal tunnel syndrome today, potentially due to prednisone; however when screening the neck, she does present with 1st rib elevation/hypomobility and hypomobile L UPA. There may be double crush syndrome component to symptoms. Pt educated on PT findings, anatomy, biomechanics, POC and rehab course. Did advise use of carpal tunnel night splint B. Pt will benefit from skilled PT to address above impairments to return to PLOF with less restriction and to reach functional goals.   Understanding of Dx/Px/POC: good     Prognosis: good    Goals  1. Pt will demonstrate understanding of HEP and POC in order to improve compliance with course of rehab in 2 weeks.  2. Pt will demonstrate awareness of appropriate posture with seated, standing and functional dynamic reaching activities in order to decrease excessive loads/pain with ADL's in 3 weeks.   3. Pt's pain will be 2/10 or less to allow pt to return to PLOF with least restriction by d/c.   4. Pt's  strength will improve by at least 10# allowing pt to return to work with less restriction by d/c.     Plan  Patient would benefit from: skilled physical therapy  Planned modality interventions: low level laser therapy    Planned therapy  interventions: joint mobilization, manual therapy, neuromuscular re-education, strengthening, stretching, therapeutic activities, therapeutic exercise and home exercise program    Frequency: 2x week  Duration in weeks: 6  Treatment plan discussed with: patient        Subjective Evaluation    History of Present Illness  Mechanism of injury: Pt states that she has numbness/tingling on the L and R burning pain in the wrists/hands primarily at night; she will be woken up by burning pain. She states that her pain started worsening around [place]. Lying on R affects R UE, lying on L affects L UE. She states that she had EMG with findings of mild B carpal tunnel syndrome and R cervical radiculopathy C6-C8 nerve root.     Pt works as sonographer nearby. She states that with gripping for long periods and work she will get pain and numbness/tingling that resolves with some rest. She does report being prescribed prednisone which she is finishing today. She denies any symptoms since being on prednisone. She has not been using a night splint for wrists.       Quality of life: good    Patient Goals  Patient goals for therapy: decreased edema, decreased pain, increased motion, increased strength and independence with ADLs/IADLs    Pain  Current pain ratin  At best pain ratin  At worst pain ratin  Location: B carpal tunnel syndrome.  Quality: burning, discomfort, dull ache and radiating          Objective    Flowsheet Rows      Flowsheet Row Most Recent Value   PT/OT G-Codes    Current Score 74   Projected Score 73        Wrist ROM:  Flex: 78/74  Ext: 76/74    Strength:   #2 position:  60#/60# (no pain)    Special Test:  Phalens test: negative  Reverse Phalens: negative  Tinels: negative    Cervical Screen:  ROM: TBA  Cervical UPA: L hypo C2/C3-C7/T1    1st rib mobility:  Hypo L, elevated L          Precautions: None    Access Code: [email or web address]  URL: [email or web address]  Date: 2025  Prepared by:  Davon Cruz      Manuals 2/24            Laser B wrists            Cervical SG Central gliders DL            1st rib mobilization DL             Median nerve glide             Neuro Re-Ed                                                                                                        Ther Ex             Tendon glide HEP            Shoulder backward rolls HEP            Median nerve floss  HEP            1st rib mobilization w/ strap HEP                                                                Ther Activity                                       Gait Training                                       Modalities

## 2025-02-27 ENCOUNTER — OFFICE VISIT (OUTPATIENT)
Dept: PHYSICAL THERAPY | Facility: CLINIC | Age: 55
End: 2025-02-27
Payer: OTHER MISCELLANEOUS

## 2025-02-27 DIAGNOSIS — G56.03 BILATERAL CARPAL TUNNEL SYNDROME: Primary | ICD-10-CM

## 2025-02-27 PROCEDURE — 97140 MANUAL THERAPY 1/> REGIONS: CPT | Performed by: PHYSICAL THERAPIST

## 2025-02-27 NOTE — PROGRESS NOTES
Daily Note     Today's date: 2025  Patient name: Kathryn Lester  : 1970  MRN: [id number]  Referring provider: Mike Damian CRNP  Dx:   Encounter Diagnosis     ICD-10-CM    1. Bilateral carpal tunnel syndrome  G56.03                      Subjective: Pt reports that her L wrist had started tingling. She did order carpal tunnel wrist splints for night time.       Objective: See treatment diary below      Assessment: Pt tolerates treatment well without significant complaints. Pt does not present with median nerve tension today. Focus on manual treatments on neural dynamics and laser for inflammation. Pt will benefit from continued skilled PT.        Plan: Continue per plan of care.      Precautions: None    Access Code: [email or web address]  URL: [email or web address]  Date: 2025  Prepared by: Davon Cruz      Manuals            Laser B wrists B wrists 4' ea 12W           Cervical SG Central gliders DL DL            1st rib mobilization DL  DL L            Median nerve glide  L            Neuro Re-Ed                                                                                                        Ther Ex             Tendon glide HEP            Shoulder backward rolls HEP            Median nerve floss  HEP            1st rib mobilization w/ strap HEP                                                                Ther Activity                                       Gait Training                                       Modalities

## 2025-03-03 ENCOUNTER — APPOINTMENT (OUTPATIENT)
Dept: URGENT CARE | Facility: CLINIC | Age: 55
End: 2025-03-03
Payer: OTHER MISCELLANEOUS

## 2025-03-03 ENCOUNTER — OFFICE VISIT (OUTPATIENT)
Dept: PHYSICAL THERAPY | Facility: CLINIC | Age: 55
End: 2025-03-03
Payer: OTHER MISCELLANEOUS

## 2025-03-03 DIAGNOSIS — G56.03 BILATERAL CARPAL TUNNEL SYNDROME: Primary | ICD-10-CM

## 2025-03-03 PROCEDURE — 99213 OFFICE O/P EST LOW 20 MIN: CPT | Performed by: NURSE PRACTITIONER

## 2025-03-03 PROCEDURE — 97140 MANUAL THERAPY 1/> REGIONS: CPT | Performed by: PHYSICAL THERAPIST

## 2025-03-03 NOTE — PROGRESS NOTES
Daily Note     Today's date: 3/3/2025  Patient name: Kathryn Lester  : 1970  MRN: [id number]  Referring provider: Mike Damian CRNP  Dx:   Encounter Diagnosis     ICD-10-CM    1. Bilateral carpal tunnel syndrome  G56.03                      Subjective: Pt denies any tingling or numbness since last visit. She has been using carpal tunnel night braces which she tolerates well.       Objective: See treatment diary below      Assessment: Pt is responding well to treatment thus far. She continues with slight L 1st rib elevation and hypomobility. Pt with improvement post manuals. Symptoms are well controlled at this time, will monitor pt and continue POC.       Plan: Continue per plan of care.      Precautions: None    Access Code: [email or web address]  URL: [email or web address]  Date: 2025  Prepared by: Davon Cruz      Manuals 2/24 2/27 3/3          Laser B wrists B wrists 4' ea 12W B wrists 4' ea 12W          Cervical SG Central gliders DL DL  DL          1st rib mobilization DL  DL L  DL L          Median nerve glide  L            Neuro Re-Ed                                                                                                        Ther Ex             Tendon glide HEP            Shoulder backward rolls HEP            Median nerve floss  HEP            1st rib mobilization w/ strap HEP                                                                Ther Activity                                       Gait Training                                       Modalities

## 2025-03-06 ENCOUNTER — OFFICE VISIT (OUTPATIENT)
Dept: PHYSICAL THERAPY | Facility: CLINIC | Age: 55
End: 2025-03-06
Payer: OTHER MISCELLANEOUS

## 2025-03-06 DIAGNOSIS — G56.03 BILATERAL CARPAL TUNNEL SYNDROME: Primary | ICD-10-CM

## 2025-03-06 PROCEDURE — 97140 MANUAL THERAPY 1/> REGIONS: CPT | Performed by: PHYSICAL THERAPIST

## 2025-03-06 NOTE — PROGRESS NOTES
Daily Note     Today's date: 3/6/2025  Patient name: Kathryn Lester  : 1970  MRN: [id number]  Referring provider: Mike Damian CRNP  Dx:   Encounter Diagnosis     ICD-10-CM    1. Bilateral carpal tunnel syndrome  G56.03                      Subjective: Pt reports that with work after last visit, she did not complain of any carpal tunnel symptoms. However she woke up this morning with R hand tingling; she says this despite utilizing carpal tunnel brace. She also reports L sided neck tightness.       Objective: See treatment diary below      Assessment: Pt tolerates treatment well without significant complaints. Continuing with central nerve gliders and 1st rib mobilization. Laser continued distally at the carpal tunnel to address inflammation. Pt will benefit from continued skilled PT.       Plan: Continue per plan of care.      Precautions: None    Access Code: [email or web address]  URL: [email or web address]  Date: 2025  Prepared by: Davon Cruz      Manuals 2/24 2/27 3/3 3/6         Laser B wrists B wrists 4' ea 12W B wrists 4' ea 12W B wrists 4' ea 14W         Cervical SG Central gliders DL DL  DL DL         1st rib mobilization DL  DL L  DL L DL B          Median nerve glide  L            Neuro Re-Ed                                                                                                        Ther Ex             Tendon glide HEP            Shoulder backward rolls HEP            Median nerve floss  HEP            1st rib mobilization w/ strap HEP                                                                Ther Activity                                       Gait Training                                       Modalities

## 2025-03-10 ENCOUNTER — OFFICE VISIT (OUTPATIENT)
Dept: PHYSICAL THERAPY | Facility: CLINIC | Age: 55
End: 2025-03-10
Payer: OTHER MISCELLANEOUS

## 2025-03-10 ENCOUNTER — APPOINTMENT (OUTPATIENT)
Dept: LAB | Facility: CLINIC | Age: 55
End: 2025-03-10
Payer: COMMERCIAL

## 2025-03-10 DIAGNOSIS — M13.0 POLYARTHROPATHY: ICD-10-CM

## 2025-03-10 DIAGNOSIS — D50.8 IRON DEFICIENCY ANEMIA SECONDARY TO INADEQUATE DIETARY IRON INTAKE: ICD-10-CM

## 2025-03-10 DIAGNOSIS — G56.03 BILATERAL CARPAL TUNNEL SYNDROME: Primary | ICD-10-CM

## 2025-03-10 DIAGNOSIS — G93.39 ICELAND DISEASE: ICD-10-CM

## 2025-03-10 DIAGNOSIS — R63.5 ABNORMAL WEIGHT GAIN: ICD-10-CM

## 2025-03-10 DIAGNOSIS — Z79.890 NEED FOR PROPHYLACTIC HORMONE REPLACEMENT THERAPY (POSTMENOPAUSAL): ICD-10-CM

## 2025-03-10 DIAGNOSIS — Z00.01 ENCOUNTER FOR GENERAL ADULT MEDICAL EXAMINATION WITH ABNORMAL FINDINGS: ICD-10-CM

## 2025-03-10 LAB
ALBUMIN SERPL BCG-MCNC: 4.7 G/DL (ref 3.5–5)
ALP SERPL-CCNC: 48 U/L (ref 34–104)
ALT SERPL W P-5'-P-CCNC: 25 U/L (ref 7–52)
ANION GAP SERPL CALCULATED.3IONS-SCNC: 5 MMOL/L (ref 4–13)
AST SERPL W P-5'-P-CCNC: 21 U/L (ref 13–39)
BASOPHILS # BLD AUTO: 0.04 THOUSANDS/ÂΜL (ref 0–0.1)
BASOPHILS NFR BLD AUTO: 1 % (ref 0–1)
BILIRUB SERPL-MCNC: 0.47 MG/DL (ref 0.2–1)
BUN SERPL-MCNC: 25 MG/DL (ref 5–25)
CALCIUM SERPL-MCNC: 9.9 MG/DL (ref 8.4–10.2)
CHLORIDE SERPL-SCNC: 105 MMOL/L (ref 96–108)
CHOLEST SERPL-MCNC: 196 MG/DL (ref ?–200)
CO2 SERPL-SCNC: 28 MMOL/L (ref 21–32)
CREAT SERPL-MCNC: 0.73 MG/DL (ref 0.6–1.3)
CRP SERPL QL: 1.3 MG/L
EOSINOPHIL # BLD AUTO: 0.16 THOUSAND/ÂΜL (ref 0–0.61)
EOSINOPHIL NFR BLD AUTO: 3 % (ref 0–6)
ERYTHROCYTE [DISTWIDTH] IN BLOOD BY AUTOMATED COUNT: 12.9 % (ref 11.6–15.1)
EST. AVERAGE GLUCOSE BLD GHB EST-MCNC: 111 MG/DL
FERRITIN SERPL-MCNC: 46 NG/ML (ref 11–307)
GFR SERPL CREATININE-BSD FRML MDRD: 94 ML/MIN/1.73SQ M
GLUCOSE P FAST SERPL-MCNC: 92 MG/DL (ref 65–99)
HBA1C MFR BLD: 5.5 %
HCT VFR BLD AUTO: 42 % (ref 34.8–46.1)
HCYS SERPL-SCNC: 11.2 UMOL/L (ref 5–15)
HDLC SERPL-MCNC: 69 MG/DL
HGB BLD-MCNC: 13.4 G/DL (ref 11.5–15.4)
IMM GRANULOCYTES # BLD AUTO: 0.01 THOUSAND/UL (ref 0–0.2)
IMM GRANULOCYTES NFR BLD AUTO: 0 % (ref 0–2)
LDLC SERPL CALC-MCNC: 110 MG/DL (ref 0–100)
LYMPHOCYTES # BLD AUTO: 1.04 THOUSANDS/ÂΜL (ref 0.6–4.47)
LYMPHOCYTES NFR BLD AUTO: 21 % (ref 14–44)
MCH RBC QN AUTO: 29.4 PG (ref 26.8–34.3)
MCHC RBC AUTO-ENTMCNC: 31.9 G/DL (ref 31.4–37.4)
MCV RBC AUTO: 92 FL (ref 82–98)
MONOCYTES # BLD AUTO: 0.3 THOUSAND/ÂΜL (ref 0.17–1.22)
MONOCYTES NFR BLD AUTO: 6 % (ref 4–12)
NEUTROPHILS # BLD AUTO: 3.47 THOUSANDS/ÂΜL (ref 1.85–7.62)
NEUTS SEG NFR BLD AUTO: 69 % (ref 43–75)
NONHDLC SERPL-MCNC: 127 MG/DL
NRBC BLD AUTO-RTO: 0 /100 WBCS
PLATELET # BLD AUTO: 243 THOUSANDS/UL (ref 149–390)
PMV BLD AUTO: 10.5 FL (ref 8.9–12.7)
POTASSIUM SERPL-SCNC: 3.8 MMOL/L (ref 3.5–5.3)
PROGEST SERPL-MCNC: 0.38 NG/ML
PROT SERPL-MCNC: 7.4 G/DL (ref 6.4–8.4)
RBC # BLD AUTO: 4.56 MILLION/UL (ref 3.81–5.12)
SODIUM SERPL-SCNC: 138 MMOL/L (ref 135–147)
T4 FREE SERPL-MCNC: 0.9 NG/DL (ref 0.61–1.12)
T4 SERPL-MCNC: 6.51 UG/DL (ref 6.09–12.23)
TESTOST SERPL-MSCNC: 30 NG/DL (ref 0–75)
TRIGL SERPL-MCNC: 87 MG/DL (ref ?–150)
TSH SERPL DL<=0.05 MIU/L-ACNC: 1.89 UIU/ML (ref 0.45–4.5)
WBC # BLD AUTO: 5.02 THOUSAND/UL (ref 4.31–10.16)

## 2025-03-10 PROCEDURE — 84403 ASSAY OF TOTAL TESTOSTERONE: CPT

## 2025-03-10 PROCEDURE — 82175 ASSAY OF ARSENIC: CPT

## 2025-03-10 PROCEDURE — 36415 COLL VENOUS BLD VENIPUNCTURE: CPT

## 2025-03-10 PROCEDURE — 84436 ASSAY OF TOTAL THYROXINE: CPT

## 2025-03-10 PROCEDURE — 83735 ASSAY OF MAGNESIUM: CPT

## 2025-03-10 PROCEDURE — 97140 MANUAL THERAPY 1/> REGIONS: CPT | Performed by: PHYSICAL THERAPIST

## 2025-03-10 PROCEDURE — 86618 LYME DISEASE ANTIBODY: CPT

## 2025-03-10 PROCEDURE — 82672 ASSAY OF ESTROGEN: CPT

## 2025-03-10 PROCEDURE — 83036 HEMOGLOBIN GLYCOSYLATED A1C: CPT

## 2025-03-10 PROCEDURE — 84443 ASSAY THYROID STIM HORMONE: CPT

## 2025-03-10 PROCEDURE — 82728 ASSAY OF FERRITIN: CPT

## 2025-03-10 PROCEDURE — 84479 ASSAY OF THYROID (T3 OR T4): CPT

## 2025-03-10 PROCEDURE — 85025 COMPLETE CBC W/AUTO DIFF WBC: CPT

## 2025-03-10 PROCEDURE — 83090 ASSAY OF HOMOCYSTEINE: CPT

## 2025-03-10 PROCEDURE — 83655 ASSAY OF LEAD: CPT

## 2025-03-10 PROCEDURE — 84144 ASSAY OF PROGESTERONE: CPT

## 2025-03-10 PROCEDURE — 84439 ASSAY OF FREE THYROXINE: CPT

## 2025-03-10 PROCEDURE — 80061 LIPID PANEL: CPT

## 2025-03-10 PROCEDURE — 86140 C-REACTIVE PROTEIN: CPT

## 2025-03-10 PROCEDURE — 83704 LIPOPROTEIN BLD QUAN PART: CPT

## 2025-03-10 PROCEDURE — 83825 ASSAY OF MERCURY: CPT

## 2025-03-10 PROCEDURE — 80053 COMPREHEN METABOLIC PANEL: CPT

## 2025-03-10 NOTE — PROGRESS NOTES
Daily Note     Today's date: 3/10/2025  Patient name: Kathryn Lester  : 1970  MRN: [id number]  Referring provider: Mike Damian CRNP  Dx:   Encounter Diagnosis     ICD-10-CM    1. Bilateral carpal tunnel syndrome  G56.03                      Subjective: Pt reports that she continues with mild tingling at night occasionally. No apparent carpal tunnel symptoms upon arrival to PT.       Objective: See treatment diary below      Assessment: Pt tolerates treatment well without significant complaints. Frequency of tingling into the hands is minimal only occurring occasionally at night. Pt will benefit from continued skilled PT.       Plan: Continue per plan of care.      Precautions: None    Access Code: [email or web address]  URL: [email or web address]  Date: 2025  Prepared by: aDvon Cruz      Manuals 2/24 2/27 3/3 3/6 3/10        Laser B wrists B wrists 4' ea 12W B wrists 4' ea 12W B wrists 4' ea 14W B wrists 4' ea 16W        Cervical SG Central gliders DL DL  DL DL DL        1st rib mobilization DL  DL L  DL L DL B  DL B        Median nerve glide  L            Neuro Re-Ed                                                                                                        Ther Ex             Tendon glide HEP            Shoulder backward rolls HEP            Median nerve floss  HEP            1st rib mobilization w/ strap HEP                                                                Ther Activity                                       Gait Training                                       Modalities

## 2025-03-11 LAB
B BURGDOR IGG+IGM SER QL IA: NEGATIVE
CHOLEST SERPL-MCNC: 215 MG/DL (ref 100–199)
HDL SERPL-SCNC: 50.5 UMOL/L
HDLC SERPL-MCNC: 79 MG/DL
LDL SERPL QN: 21.3 NM
LDL SERPL QN: 494 NMOL/L
LDL SERPL-SCNC: 1140 NMOL/L
LDLC SERPL CALC-MCNC: 120 MG/DL (ref 0–99)
LP-IR SCORE SERPL: 36
T3RU NFR SERPL: 27 % (ref 24–39)
TRIGL SERPL-MCNC: 94 MG/DL (ref 0–149)

## 2025-03-12 LAB — ESTROGEN SERPL-MCNC: 56 PG/ML

## 2025-03-13 ENCOUNTER — APPOINTMENT (OUTPATIENT)
Dept: PHYSICAL THERAPY | Facility: CLINIC | Age: 55
End: 2025-03-13
Payer: OTHER MISCELLANEOUS

## 2025-03-14 LAB — MAGNESIUM RBC-MCNC: 6.5 MG/DL (ref 3.7–7)

## 2025-03-17 ENCOUNTER — OFFICE VISIT (OUTPATIENT)
Dept: PHYSICAL THERAPY | Facility: CLINIC | Age: 55
End: 2025-03-17
Payer: OTHER MISCELLANEOUS

## 2025-03-17 ENCOUNTER — APPOINTMENT (OUTPATIENT)
Dept: URGENT CARE | Facility: CLINIC | Age: 55
End: 2025-03-17
Payer: OTHER MISCELLANEOUS

## 2025-03-17 DIAGNOSIS — G56.03 BILATERAL CARPAL TUNNEL SYNDROME: Primary | ICD-10-CM

## 2025-03-17 PROCEDURE — 99213 OFFICE O/P EST LOW 20 MIN: CPT | Performed by: PHYSICIAN ASSISTANT

## 2025-03-17 PROCEDURE — 97140 MANUAL THERAPY 1/> REGIONS: CPT | Performed by: PHYSICAL THERAPIST

## 2025-03-17 NOTE — PROGRESS NOTES
Discharge    Today's date: 3/17/2025  Patient name: Kathryn Lester  : 1970  MRN: [id number]  Referring provider: Mike Damian CRNP  Dx:   Encounter Diagnosis     ICD-10-CM    1. Bilateral carpal tunnel syndrome  G56.03                      Subjective: Pt reports mild tingling in the R hand last night but overall doing well. With work, minimal complaints.       Objective: See treatment diary below      Assessment: Pt tolerates treatment well today. Pt understands activity modification, use of night splint and nerve glide exercises. Pt is appropriate for discharge at this time.       Plan: Discharge     Precautions: None    Access Code: [email or web address]  URL: [email or web address]  Date: 2025  Prepared by: Davon Cruz      Manuals 2/24 2/27 3/3 3/6 3/10 3/17       Laser B wrists B wrists 4' ea 12W B wrists 4' ea 12W B wrists 4' ea 14W B wrists 4' ea 16W B wrists 4' ea 16W       Cervical SG Central gliders DL DL  DL DL DL DL       1st rib mobilization DL  DL L  DL L DL B  DL B DL B       Median nerve glide  L            Neuro Re-Ed                                                                                                        Ther Ex             Tendon glide HEP            Shoulder backward rolls HEP            Median nerve floss  HEP            1st rib mobilization w/ strap HEP                                                                Ther Activity                                       Gait Training                                       Modalities

## 2025-03-18 LAB
ARSENIC BLD-MCNC: 3 UG/L (ref 0–9)
LEAD BLD-MCNC: <1 UG/DL (ref 0–3.4)
MERCURY BLD-MCNC: 2.6 UG/L (ref 0–14.9)

## 2025-04-07 ENCOUNTER — TELEPHONE (OUTPATIENT)
Dept: INTERNAL MEDICINE CLINIC | Facility: CLINIC | Age: 55
End: 2025-04-07

## 2025-05-08 ENCOUNTER — APPOINTMENT (OUTPATIENT)
Dept: LAB | Facility: CLINIC | Age: 55
End: 2025-05-08
Payer: COMMERCIAL

## 2025-05-08 DIAGNOSIS — R53.83 OTHER FATIGUE: ICD-10-CM

## 2025-05-08 DIAGNOSIS — Z78.0 MENOPAUSE: ICD-10-CM

## 2025-05-08 DIAGNOSIS — E28.310 SYMPTOMATIC PREMATURE MENOPAUSE: ICD-10-CM

## 2025-05-08 DIAGNOSIS — E28.39 RESISTANT OVARY SYNDROME: ICD-10-CM

## 2025-05-08 DIAGNOSIS — Z13.29 SCREENING FOR THYROID DISORDER: ICD-10-CM

## 2025-05-08 LAB
25(OH)D3 SERPL-MCNC: 43.3 NG/ML (ref 30–100)
CORTIS AM PEAK SERPL-MCNC: 7.8 UG/DL (ref 6.7–22.6)
ESTRADIOL SERPL-MCNC: 30.9 PG/ML
FSH SERPL-ACNC: 43.6 MIU/ML
LH SERPL-ACNC: 40.2 MIU/ML
PROGEST SERPL-MCNC: 0.77 NG/ML
TSH SERPL DL<=0.05 MIU/L-ACNC: 1.94 UIU/ML (ref 0.45–4.5)

## 2025-05-08 PROCEDURE — 82306 VITAMIN D 25 HYDROXY: CPT

## 2025-05-08 PROCEDURE — 86800 THYROGLOBULIN ANTIBODY: CPT

## 2025-05-08 PROCEDURE — 82533 TOTAL CORTISOL: CPT

## 2025-05-08 PROCEDURE — 84443 ASSAY THYROID STIM HORMONE: CPT

## 2025-05-08 PROCEDURE — 84144 ASSAY OF PROGESTERONE: CPT

## 2025-05-08 PROCEDURE — 82670 ASSAY OF TOTAL ESTRADIOL: CPT

## 2025-05-08 PROCEDURE — 36415 COLL VENOUS BLD VENIPUNCTURE: CPT

## 2025-05-08 PROCEDURE — 86376 MICROSOMAL ANTIBODY EACH: CPT

## 2025-05-08 PROCEDURE — 83002 ASSAY OF GONADOTROPIN (LH): CPT

## 2025-05-08 PROCEDURE — 83001 ASSAY OF GONADOTROPIN (FSH): CPT

## 2025-05-08 PROCEDURE — 84403 ASSAY OF TOTAL TESTOSTERONE: CPT

## 2025-05-08 PROCEDURE — 82627 DEHYDROEPIANDROSTERONE: CPT

## 2025-05-08 PROCEDURE — 84402 ASSAY OF FREE TESTOSTERONE: CPT

## 2025-05-09 LAB
TESTOST FREE SERPL-MCNC: 0.6 PG/ML (ref 0–4.2)
TESTOST SERPL-MCNC: 4 NG/DL (ref 4–50)
THYROGLOB AB SERPL-ACNC: <1 IU/ML (ref 0–0.9)

## 2025-05-10 LAB
DHEA-S SERPL-MCNC: 74.2 UG/DL (ref 41.2–243.7)
THYROPEROXIDASE AB SERPL-ACNC: <9 IU/ML (ref 0–34)

## 2025-05-22 ENCOUNTER — VBI (OUTPATIENT)
Dept: ADMINISTRATIVE | Facility: OTHER | Age: 55
End: 2025-05-22

## 2025-05-22 DIAGNOSIS — Z12.12 ENCOUNTER FOR COLORECTAL CANCER SCREENING USING COLOGUARD TEST: Primary | ICD-10-CM

## 2025-05-22 DIAGNOSIS — Z12.11 ENCOUNTER FOR COLORECTAL CANCER SCREENING USING COLOGUARD TEST: Primary | ICD-10-CM

## 2025-05-22 NOTE — TELEPHONE ENCOUNTER
05/22/25 11:36 AM    The patient was called and agreed to complete a Cologuard test. The order has been signed and sent to the provider for co-sign.    Thank you.  Aileen Ryder MA  [facility]

## 2025-06-10 LAB — COLOGUARD RESULT REPORTABLE: NEGATIVE

## 2025-06-23 ENCOUNTER — OFFICE VISIT (OUTPATIENT)
Dept: URGENT CARE | Facility: CLINIC | Age: 55
End: 2025-06-23
Payer: COMMERCIAL

## 2025-06-23 VITALS
RESPIRATION RATE: 16 BRPM | SYSTOLIC BLOOD PRESSURE: 108 MMHG | TEMPERATURE: 98.8 F | BODY MASS INDEX: 21.65 KG/M2 | WEIGHT: 137.2 LBS | OXYGEN SATURATION: 98 % | HEART RATE: 87 BPM | DIASTOLIC BLOOD PRESSURE: 68 MMHG

## 2025-06-23 DIAGNOSIS — R50.9 FEVER, UNSPECIFIED FEVER CAUSE: Primary | ICD-10-CM

## 2025-06-23 LAB — S PYO AG THROAT QL: NEGATIVE

## 2025-06-23 PROCEDURE — 99213 OFFICE O/P EST LOW 20 MIN: CPT | Performed by: PHYSICIAN ASSISTANT

## 2025-06-23 PROCEDURE — 87636 SARSCOV2 & INF A&B AMP PRB: CPT | Performed by: PHYSICIAN ASSISTANT

## 2025-06-23 PROCEDURE — 87880 STREP A ASSAY W/OPTIC: CPT | Performed by: PHYSICIAN ASSISTANT

## 2025-06-23 RX ORDER — ONDANSETRON 4 MG/1
4 TABLET, ORALLY DISINTEGRATING ORAL EVERY 8 HOURS PRN
Qty: 15 TABLET | Refills: 0 | Status: SHIPPED | OUTPATIENT
Start: 2025-06-23

## 2025-06-23 NOTE — PROGRESS NOTES
[facility]        NAME: Kathryn Lester is a 55 y.o. female  : 1970    MRN: [id number]  DATE: 2025  TIME: 11:21 AM    Assessment and Plan   Fever, unspecified fever cause [R50.9]  1. Fever, unspecified fever cause  Covid/Flu- Office Collect Normal    ondansetron (ZOFRAN-ODT) 4 mg disintegrating tablet    Covid/Flu- Office Collect Normal    POCT rapid ANTIGEN strepA        COVID/flu swabbing sent to lab given patient's high fevers.  Zofran for nausea.  Discussed doing Motrin/Tylenol as needed for fevers.  Work note provided.    Patient Instructions     Patient Instructions   Your COVID/flu swab will result in the next 24 hours.  Take the Zofran as needed for nausea.  Motrin/Tylenol for fevers.  Patient Education     Fever in adults - Discharge instructions   The Basics   Written by the doctors and editors at [facility]   What are discharge instructions? -- Discharge instructions are information about how to take care of yourself after getting medical care for a health problem.  What is a fever? -- A fever is a rise in body temperature that goes above a certain level. In general, a fever means a temperature above 100.4°F (38°C). You might get slightly different numbers depending on how you take your temperature: oral (mouth), armpit, ear, forehead, or rectal.  What causes fever? -- The most common cause of fever in adults is infection. Infections that can cause fever include:   COVID-19   A cold or flu   A lung infection, such as pneumonia   A stomach virus  How do I care for myself at home? -- Ask the doctor or nurse what you should do when you go home. Make sure that you understand exactly what you need to do to care for yourself. Ask questions if there is anything you do not understand.  You should also:   Drink lots of water, juice, or broth to replace fluids lost from the fever.   Dress in lightweight clothes. Use a sheet or light blanket if you are cold.   Take medicine like  acetaminophen (sample brand name: Tylenol) or ibuprofen (sample brand names: Advil, Motrin) to help bring down your fever.   Stay at home until your fever is gone for 24 hours without the use of fever-reducing medicines. If you had an infection, this helps prevent it from spreading to other people.   Wash your hands often (figure 1). Cough or sneeze into a tissue or your elbow instead of your hands. When around others, you might also want to wear a face mask. These steps can help keep the people around you healthy.  What follow-up care do I need? -- Your doctor or nurse will tell you if you need to make a follow-up appointment. If so, make sure that you know when and where to go.  When should I call the doctor? -- Call for emergency help right away (in the [place] and [place], call 9-1-1) if you:   Have a fever of 100.4°F (38°C) or higher, and other symptoms like:   Trouble breathing   Severe headache and neck stiffness   Confusion   Seizure   You have signs of severe fluid loss, such as:   You have no urine for more than 8 hours.   You feel very lightheaded or like you are going to pass out.   You feel weak, like you are going to fall.  Call for advice if:   You have a fever of 100.4°F (38°C) or higher that lasts for several days or keeps coming back.   You develop early signs of fluid loss, such as:   Dark-colored urine   Dry mouth   Muscle cramps   Lack of energy   Feeling lightheaded when you get up   You have a rash.   You have stomach pain, vomiting, or diarrhea.   You have new or worsening symptoms.  All topics are updated as new evidence becomes available and our peer review process is complete.  This topic retrieved from [employer] on: Apr 11, 2024.  Topic 034423 Version 1.0  Release: 32.3.2 - C32.100  © 2024 UpToDate, Inc. and/or its affiliates. All rights reserved.  figure 1: How to wash your hands     Wet your hands with clean water, and apply a small amount of soap. Lather and rub hands together for at least 20  seconds. Clean your wrists, palms, backs of your hands, between your fingers, tips of your fingers, thumbs, and under and around your nails. Rinse well, and dry your hands using a clean towel.  Graphic 940714 Version 7.0  Consumer Information Use and Disclaimer   Disclaimer: This generalized information is a limited summary of diagnosis, treatment, and/or medication information. It is not meant to be comprehensive and should be used as a tool to help the user understand and/or assess potential diagnostic and treatment options. It does NOT include all information about conditions, treatments, medications, side effects, or risks that may apply to a specific patient. It is not intended to be medical advice or a substitute for the medical advice, diagnosis, or treatment of a health care provider based on the health care provider's examination and assessment of a patient's specific and unique circumstances. Patients must speak with a health care provider for complete information about their health, medical questions, and treatment options, including any risks or benefits regarding use of medications. This information does not endorse any treatments or medications as safe, effective, or approved for treating a specific patient. UpToDate, Inc. and its affiliates disclaim any warranty or liability relating to this information or the use thereof.The use of this information is governed by the Terms of Use, available at https://www.[employer].com/en/know/clinical-effectiveness-terms. 2024© UpToDate, Inc. and its affiliates and/or licensors. All rights reserved.  Copyright   © 2024 UpToDate, Inc. and/or its affiliates. All rights reserved.        Follow up with PCP in 3-5 days.  Proceed to  ER if symptoms worsen.    Chief Complaint     Chief Complaint   Patient presents with    Fever     Started on Saturday feeling nauseas, reports decreased appetite, body aches, tmax: 102, she's been managing with Tylenol          History  of Present Illness       The patient is a 55-year-old female presenting today for 3 days of a fever, myalgias, chills and nausea/vomiting. 4 episodes of emesis yesterday which resolved.  Denies abdominal pain or diarrhea.   Has been taking Tylenol.  Tmax 102 F. This morning her temp was 100.7 F.  Denies UTI symptoms.  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        Review of Systems   Review of Systems   Constitutional:  Positive for fever. Negative for activity change, appetite change, chills and fatigue.   HENT:  Negative for congestion, ear pain, rhinorrhea, sinus pressure, sinus pain and sore throat.    Eyes:  Negative for pain and visual disturbance.   Respiratory:  Negative for cough, chest tightness and shortness of breath.    Cardiovascular:  Negative for chest pain and palpitations.   Gastrointestinal:  Positive for nausea and vomiting (resolved). Negative for abdominal pain and diarrhea.   Genitourinary:  Negative for dysuria and hematuria.   Musculoskeletal:  Positive for myalgias. Negative for arthralgias and back pain.   Skin:  Negative for color change, pallor and rash.   Neurological:  Negative for seizures, syncope and headaches.   All other systems reviewed and are negative.        Current Medications     Current Medications[1]    Current Allergies     Allergies as of 06/23/2025    (No Known Allergies)            The following portions of the patient's history were reviewed and updated as appropriate: allergies, current medications, past family history, past medical history, past social history, past surgical history and problem list.     Past Medical History[2]    Past Surgical History[3]    Family History[4]      Medications have been verified.        Objective   /68 (BP Location: Left arm, Patient Position: Sitting, Cuff Size: Standard)   Pulse 87   Temp 98.8 °F (37.1 °C) (Tympanic)   Resp 16   Wt 62.2 kg (137 lb 3.2 oz)   SpO2 98%   BMI  21.65 kg/m²        Physical Exam     Physical Exam  Vitals reviewed.   Constitutional:       General: She is not in acute distress.     Appearance: Normal appearance. She is well-developed and normal weight. She is not ill-appearing, toxic-appearing or diaphoretic.   HENT:      Head: Normocephalic and atraumatic.      Right Ear: Tympanic membrane and ear canal normal. No drainage, swelling or tenderness. No middle ear effusion. Tympanic membrane is not erythematous.      Left Ear: Tympanic membrane and ear canal normal. No drainage, swelling or tenderness.  No middle ear effusion. Tympanic membrane is not erythematous.      Nose: No congestion or rhinorrhea.      Mouth/Throat:      Mouth: Mucous membranes are moist. No oral lesions.      Pharynx: Oropharynx is clear. Uvula midline. No pharyngeal swelling, oropharyngeal exudate, posterior oropharyngeal erythema or uvula swelling.      Tonsils: No tonsillar exudate or tonsillar abscesses. 0 on the right. 0 on the left.     Eyes:      Extraocular Movements:      Right eye: Normal extraocular motion.      Left eye: Normal extraocular motion.      Conjunctiva/sclera: Conjunctivae normal.      Pupils: Pupils are equal, round, and reactive to light.       Cardiovascular:      Rate and Rhythm: Normal rate and regular rhythm.      Heart sounds: Normal heart sounds. No murmur heard.     No friction rub. No gallop.   Pulmonary:      Effort: Pulmonary effort is normal. No respiratory distress.      Breath sounds: Normal breath sounds. No stridor. No wheezing, rhonchi or rales.   Chest:      Chest wall: No tenderness.   Abdominal:      General: Abdomen is flat. Bowel sounds are normal. There is no distension.      Palpations: Abdomen is soft.      Tenderness: There is no abdominal tenderness. There is no guarding.     Musculoskeletal:         General: Normal range of motion.      Cervical back: Normal range of motion.   Lymphadenopathy:      Cervical: No cervical adenopathy.      Skin:     General: Skin is warm and dry.      Capillary Refill: Capillary refill takes less than 2 seconds.     Neurological:      Mental Status: She is alert.                        [1]   Current Outpatient Medications:     ondansetron (ZOFRAN-ODT) 4 mg disintegrating tablet, Take 1 tablet (4 mg total) by mouth every 8 (eight) hours as needed for nausea or vomiting, Disp: 15 tablet, Rfl: 0    BIOTIN PO, Take by mouth, Disp: , Rfl:     Cholecalciferol (D3) 50 MCG (2000 UT) TABS, Take by mouth, Disp: , Rfl:     Coenzyme Q10 (CoQ10) 200 MG CAPS, , Disp: , Rfl:     MAGNESIUM PO, Take by mouth, Disp: , Rfl:     meloxicam (Mobic) 15 mg tablet, Take 1 tablet (15 mg total) by mouth daily, Disp: 30 tablet, Rfl: 0    Multiple Vitamins-Minerals (VITAMIN D3 COMPLETE PO), Take by mouth, Disp: , Rfl:     Turmeric (Curcumin 95) 500 MG CAPS, Take by mouth, Disp: , Rfl:   [2]   Past Medical History:  Diagnosis Date    Fibroid     Herpes    [3]   Past Surgical History:  Procedure Laterality Date    BREAST CYST ASPIRATION Left 2009    Benign    IR UTERINE ARTERY EMBOLIZATION     [4]   Family History  Problem Relation Name Age of Onset    Hypertension Mother Jessica     Breast cancer Mother Jessica         50's    Diabetes Mother Jessica     Parkinsonism Father      Stroke Sister Kamila     Stroke Sister Crystal     No Known Problems Maternal Grandmother      No Known Problems Maternal Grandfather      No Known Problems Paternal Grandmother      No Known Problems Paternal Grandfather      Heart disease Son Daniel     No Known Problems Maternal Aunt      No Known Problems Maternal Aunt      No Known Problems Maternal Aunt      No Known Problems Maternal Aunt      No Known Problems Maternal Aunt      No Known Problems Maternal Aunt      No Known Problems Maternal Aunt      No Known Problems Paternal Aunt      No Known Problems Half-Sister

## 2025-06-23 NOTE — PATIENT INSTRUCTIONS
Your COVID/flu swab will result in the next 24 hours.  Take the Zofran as needed for nausea.  Motrin/Tylenol for fevers.  Patient Education     Fever in adults - Discharge instructions   The Basics   Written by the doctors and editors at [facility]   What are discharge instructions? -- Discharge instructions are information about how to take care of yourself after getting medical care for a health problem.  What is a fever? -- A fever is a rise in body temperature that goes above a certain level. In general, a fever means a temperature above 100.4°F (38°C). You might get slightly different numbers depending on how you take your temperature: oral (mouth), armpit, ear, forehead, or rectal.  What causes fever? -- The most common cause of fever in adults is infection. Infections that can cause fever include:   COVID-19   A cold or flu   A lung infection, such as pneumonia   A stomach virus  How do I care for myself at home? -- Ask the doctor or nurse what you should do when you go home. Make sure that you understand exactly what you need to do to care for yourself. Ask questions if there is anything you do not understand.  You should also:   Drink lots of water, juice, or broth to replace fluids lost from the fever.   Dress in lightweight clothes. Use a sheet or light blanket if you are cold.   Take medicine like acetaminophen (sample brand name: Tylenol) or ibuprofen (sample brand names: Advil, Motrin) to help bring down your fever.   Stay at home until your fever is gone for 24 hours without the use of fever-reducing medicines. If you had an infection, this helps prevent it from spreading to other people.   Wash your hands often (figure 1). Cough or sneeze into a tissue or your elbow instead of your hands. When around others, you might also want to wear a face mask. These steps can help keep the people around you healthy.  What follow-up care do I need? -- Your doctor or nurse will tell you if you need to make a  follow-up appointment. If so, make sure that you know when and where to go.  When should I call the doctor? -- Call for emergency help right away (in the [place] and [place], call 9-1-1) if you:   Have a fever of 100.4°F (38°C) or higher, and other symptoms like:   Trouble breathing   Severe headache and neck stiffness   Confusion   Seizure   You have signs of severe fluid loss, such as:   You have no urine for more than 8 hours.   You feel very lightheaded or like you are going to pass out.   You feel weak, like you are going to fall.  Call for advice if:   You have a fever of 100.4°F (38°C) or higher that lasts for several days or keeps coming back.   You develop early signs of fluid loss, such as:   Dark-colored urine   Dry mouth   Muscle cramps   Lack of energy   Feeling lightheaded when you get up   You have a rash.   You have stomach pain, vomiting, or diarrhea.   You have new or worsening symptoms.  All topics are updated as new evidence becomes available and our peer review process is complete.  This topic retrieved from "[employer] on: Apr 11, 2024.  Topic 620043 Version 1.0  Release: 32.3.2 - C32.100  © 2024 UpToDate, Inc. and/or its affiliates. All rights reserved.  figure 1: How to wash your hands     Wet your hands with clean water, and apply a small amount of soap. Lather and rub hands together for at least 20 seconds. Clean your wrists, palms, backs of your hands, between your fingers, tips of your fingers, thumbs, and under and around your nails. Rinse well, and dry your hands using a clean towel.  Graphic 755825 Version 7.0  Consumer Information Use and Disclaimer   Disclaimer: This generalized information is a limited summary of diagnosis, treatment, and/or medication information. It is not meant to be comprehensive and should be used as a tool to help the user understand and/or assess potential diagnostic and treatment options. It does NOT include all information about conditions, treatments, medications,  side effects, or risks that may apply to a specific patient. It is not intended to be medical advice or a substitute for the medical advice, diagnosis, or treatment of a health care provider based on the health care provider's examination and assessment of a patient's specific and unique circumstances. Patients must speak with a health care provider for complete information about their health, medical questions, and treatment options, including any risks or benefits regarding use of medications. This information does not endorse any treatments or medications as safe, effective, or approved for treating a specific patient. UpToDate, Inc. and its affiliates disclaim any warranty or liability relating to this information or the use thereof.The use of this information is governed by the Terms of Use, available at https://www.[employer].com/en/know/clinical-effectiveness-terms. 2024© UpToDate, Inc. and its affiliates and/or licensors. All rights reserved.  Copyright   © 2024 UpToDate, Inc. and/or its affiliates. All rights reserved.

## 2025-06-23 NOTE — LETTER
June 23, 2025     Patient: Kathryn Lester  YOB: 1970  Date of Visit: 6/23/2025      To Whom it May Concern:    Kathryn Lester is under my professional care. Kathryn was seen in my office on 6/23/2025. Kathryn may return to work when she is 24 hours fever free.    If you have any questions or concerns, please don't hesitate to call.         Sincerely,          Edita Gibbs PA-C        CC: No Recipients

## 2025-06-24 LAB
FLUAV RNA RESP QL NAA+PROBE: NEGATIVE
FLUBV RNA RESP QL NAA+PROBE: NEGATIVE
SARS-COV-2 RNA RESP QL NAA+PROBE: NEGATIVE

## 2025-07-23 ENCOUNTER — APPOINTMENT (OUTPATIENT)
Dept: LAB | Facility: CLINIC | Age: 55
End: 2025-07-23
Payer: COMMERCIAL